# Patient Record
Sex: FEMALE | Race: WHITE | NOT HISPANIC OR LATINO | URBAN - METROPOLITAN AREA
[De-identification: names, ages, dates, MRNs, and addresses within clinical notes are randomized per-mention and may not be internally consistent; named-entity substitution may affect disease eponyms.]

---
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
## 2020-02-06 ENCOUNTER — EMERGENCY (EMERGENCY)
Facility: HOSPITAL | Age: 20
LOS: 0 days | Discharge: HOME | End: 2020-02-06
Attending: EMERGENCY MEDICINE | Admitting: STUDENT IN AN ORGANIZED HEALTH CARE EDUCATION/TRAINING PROGRAM
Payer: COMMERCIAL

## 2020-02-06 VITALS
DIASTOLIC BLOOD PRESSURE: 92 MMHG | HEART RATE: 84 BPM | OXYGEN SATURATION: 100 % | TEMPERATURE: 98 F | RESPIRATION RATE: 20 BRPM | SYSTOLIC BLOOD PRESSURE: 151 MMHG | WEIGHT: 139.55 LBS

## 2020-02-06 DIAGNOSIS — R10.32 LEFT LOWER QUADRANT PAIN: ICD-10-CM

## 2020-02-06 DIAGNOSIS — R11.0 NAUSEA: ICD-10-CM

## 2020-02-06 DIAGNOSIS — R10.9 UNSPECIFIED ABDOMINAL PAIN: ICD-10-CM

## 2020-02-06 LAB
APPEARANCE UR: CLEAR — SIGNIFICANT CHANGE UP
BILIRUB UR-MCNC: NEGATIVE — SIGNIFICANT CHANGE UP
COLOR SPEC: YELLOW — SIGNIFICANT CHANGE UP
DIFF PNL FLD: NEGATIVE — SIGNIFICANT CHANGE UP
GLUCOSE UR QL: NEGATIVE — SIGNIFICANT CHANGE UP
KETONES UR-MCNC: NEGATIVE — SIGNIFICANT CHANGE UP
LEUKOCYTE ESTERASE UR-ACNC: NEGATIVE — SIGNIFICANT CHANGE UP
NITRITE UR-MCNC: NEGATIVE — SIGNIFICANT CHANGE UP
PH UR: 7 — SIGNIFICANT CHANGE UP (ref 5–8)
PROT UR-MCNC: SIGNIFICANT CHANGE UP
SP GR SPEC: 1.02 — SIGNIFICANT CHANGE UP (ref 1.01–1.02)
UROBILINOGEN FLD QL: SIGNIFICANT CHANGE UP

## 2020-02-06 PROCEDURE — 76830 TRANSVAGINAL US NON-OB: CPT | Mod: 26

## 2020-02-06 PROCEDURE — 99284 EMERGENCY DEPT VISIT MOD MDM: CPT

## 2020-02-06 NOTE — ED PROVIDER NOTE - PHYSICAL EXAMINATION
HEAD:  normocephalic, atraumatic  EYES:  conjunctivae without injection, drainage or discharge  ENMT:  tympanic membranes pearly gray with normal landmarks; nasal mucosa moist; mouth moist without ulcerations or lesions; throat moist without erythema, exudate, ulcerations or lesions  NECK:  supple, no masses, no significant lymphadenopathy  CARDIAC:  regular rate and rhythm, normal S1 and S2, no murmurs, rubs or gallops  RESP:  respiratory rate and effort appear normal for age; lungs are clear to auscultation bilaterally; no rales or wheezes  ABDOMEN:  soft, tender L suprapubic  LYMPHATICS:  no significant lymphadenopathy  MUSCULOSKELETAL/NEURO:  normal movement, normal tone  SKIN:  normal skin color for age and race, well-perfused; warm and dry

## 2020-02-06 NOTE — ED PROVIDER NOTE - PATIENT PORTAL LINK FT
You can access the FollowMyHealth Patient Portal offered by [facility] by registering at the following website: [email or web address]. By joining [employer]’s FollowMyHealth portal, you will also be able to view your health information using other applications (apps) compatible with our system.

## 2020-02-06 NOTE — ED PROVIDER NOTE - NS ED ROS FT
Constitutional:  see HPI  Head:  no change in behavior or LOC  Eyes:  no eye redness, or discharge  ENMT:  no mouth or throat sores or lesions, not tugging at ears  Cardiac: no cyanosis  Respiratory: no cough, wheezing, or trouble breathing  GI: suprapubic pain; no vomiting or diarrhea or stool color change  :  no change in urine output  MS: no joint swelling or redness  Neuro:  no seizure, no change in movements of arms and legs  Skin:  no rashes or color changes; no lacerations or abrasions

## 2020-02-06 NOTE — ED PEDIATRIC TRIAGE NOTE - CHIEF COMPLAINT QUOTE
patient reports LUQ and LLQ radiating to flank x4 days no fever . patient reports pain before urinating and grey color stool.

## 2020-02-06 NOTE — ED PEDIATRIC NURSE NOTE - OBJECTIVE STATEMENT
Patient present to ED with complains of LLQ abdomen pain x 4 days with radiating to LUQ with nausea. Denies fever, chills, vomiting, diarrhea, blood in stool and dysuria. Patient recent traveled to [place] about 2 weeks ago.

## 2020-02-06 NOTE — ED PEDIATRIC NURSE NOTE - NSIMPLEMENTINTERV_GEN_ALL_ED
Implemented All Universal Safety Interventions:  [place] to call system. Call bell, personal items and telephone within reach. Instruct patient to call for assistance. Room bathroom lighting operational. Non-slip footwear when patient is off stretcher. Physically safe environment: no spills, clutter or unnecessary equipment. Stretcher in lowest position, wheels locked, appropriate side rails in place.

## 2020-02-06 NOTE — ED PROVIDER NOTE - NSPTACCESSSVCSAPPTDETAILS_ED_ALL_ED_FT
Please follow-up with your primary care physician or OB/GYN for repeat imaging of your ovarian cyst.

## 2020-02-06 NOTE — ED PROVIDER NOTE - ATTENDING CONTRIBUTION TO CARE
20 yo f hx ovarian cysts, size unclear, previous GC/chlamydia  pt here for 4 days intermittent L suprapubic pain, no associated dysuria/hematuria.  pain sharp, intermittent, worse w/ certain movement, not worse w/ PO intake. sx does not feel like uti. pt also endorsing pale stools. no fevers. + nausea, no vomiting or diarrhea. no vaginal discharge. pt LMP 1/20      vss  gen- NAD, aaox3  card-rrr  lungs-ctab, no wheezing or rhonchi  abd-+ left lower quadrand ttp, no guarding or rebound  Pelvic- pending  neuro- full str/sensation, cn ii-xii grossly intact, normal coordination    will get belly labs, pelvic sono, ua, no e/o std, will send swab, will provide supportive care, serial exam and ED observation period

## 2020-02-06 NOTE — ED PROVIDER NOTE - NSFOLLOWUPCLINICS_GEN_ALL_ED_FT
[facility]  OB/GYN  [address] [place] [address]  Phone: [phone]  Fax:   Follow Up Time: 1-3 Days

## 2020-02-06 NOTE — ED PROVIDER NOTE - CLINICAL SUMMARY MEDICAL DECISION MAKING FREE TEXT BOX
18 yo F with h/o ovarian cysts here for LLQ pain, no fever, no dysuria, no vag d/c, LMP 1/20/20. Upreg negative, US done - with 4.9 cm left ovarian cyst with nl flow. D/Edilberto home with f/u with OB/GYN with repeat US recommended in 6-8 weeks. Given strict return precautions.

## 2020-02-06 NOTE — ED PROVIDER NOTE - OBJECTIVE STATEMENT
18 yo female with hx of ovarian cysts presenting with abdominal pain. Pt reporting intermittent sharp L suprapubic pain for the past 4 days worse with movement and no association with meals or defecation. denies dysuria, hematuria, vaginal discharge, nausea, vomiting, diarrhea, cp. Last period was 1/20.

## 2020-02-06 NOTE — ED PROVIDER NOTE - NSFOLLOWUPINSTRUCTIONS_ED_ALL_ED_FT
Ovarian Cyst    An ovarian cyst is a fluid-filled sac that forms on an ovary. The ovaries are small organs that produce eggs in women. Various types of cysts can form on the ovaries. Most are not cancerous. Many do not cause problems, and they often go away on their own. Some may cause symptoms and require treatment. Common types of ovarian cysts include:     Functional cysts—These cysts may occur every month during the menstrual cycle. This is normal. The cysts usually go away with the next menstrual cycle if the woman does not get pregnant. Usually, there are no symptoms with a functional cyst.  Endometrioma cysts—These cysts form from the tissue that lines the uterus. They are also called "chocolate cysts" because they become filled with blood that turns brown. This type of cyst can cause pain in the lower abdomen during intercourse and with your menstrual period.  Cystadenoma cysts—This type develops from the cells on the outside of the ovary. These cysts can get very big and cause lower abdomen pain and pain with intercourse. This type of cyst can twist on itself, cut off its blood supply, and cause severe pain. It can also easily rupture and cause a lot of pain.  Dermoid cysts—This type of cyst is sometimes found in both ovaries. These cysts may contain different kinds of body tissue, such as skin, teeth, hair, or cartilage. They usually do not cause symptoms unless they get very big.   Theca lutein cysts—These cysts occur when too much of a certain hormone (human chorionic gonadotropin) is produced and overstimulates the ovaries to produce an egg. This is most common after procedures used to assist with the conception of a baby (in vitro fertilization).    CAUSES  Fertility drugs can cause a condition in which multiple large cysts are formed on the ovaries. This is called ovarian hyperstimulation syndrome.   A condition called polycystic ovary syndrome can cause hormonal imbalances that can lead to nonfunctional ovarian cysts.     SIGNS AND SYMPTOMS  Many ovarian cysts do not cause symptoms. If symptoms are present, they may include:    Pelvic pain or pressure.  Pain in the lower abdomen.  Pain during sexual intercourse.  Increasing girth (swelling) of the abdomen.  Abnormal menstrual periods.  Increasing pain with menstrual periods.  Stopping having menstrual periods without being pregnant.    DIAGNOSIS  These cysts are commonly found during a routine or annual pelvic exam. Tests may be ordered to find out more about the cyst. These tests may include:    Ultrasound.  X-ray of the pelvis.  CT scan.  MRI.  Blood tests.    TREATMENT  Many ovarian cysts go away on their own without treatment. Your health care provider may want to check your cyst regularly for 2–3 months to see if it changes. For women in menopause, it is particularly important to monitor a cyst closely because of the higher rate of ovarian cancer in menopausal women. When treatment is needed, it may include any of the following:    A procedure to drain the cyst (aspiration). This may be done using a long needle and ultrasound. It can also be done through a laparoscopic procedure. This involves using a thin, lighted tube with a tiny camera on the end (laparoscope) inserted through a small incision.   Surgery to remove the whole cyst. This may be done using laparoscopic surgery or an open surgery involving a larger incision in the lower abdomen.   Hormone treatment or birth control pills. These methods are sometimes used to help dissolve a cyst.    HOME CARE INSTRUCTIONS  Only take over-the-counter or prescription medicines as directed by your health care provider.   Follow up with your health care provider as directed.   Get regular pelvic exams and Pap tests.    SEEK MEDICAL CARE IF:  Your periods are late, irregular, or painful, or they stop.  Your pelvic pain or abdominal pain does not go away.  Your abdomen becomes larger or swollen.  You have pressure on your bladder or trouble emptying your bladder completely.  You have pain during sexual intercourse.  You have feelings of fullness, pressure, or discomfort in your stomach.  You lose weight for no apparent reason.  You feel generally ill.  You become constipated.  You lose your appetite.  You develop acne.  You have an increase in body and facial hair.  You are gaining weight, without changing your exercise and eating habits.  You think you are pregnant.    SEEK IMMEDIATE MEDICAL CARE IF:  You have increasing abdominal pain.  You feel sick to your stomach (nauseous), and you throw up (vomit).  You develop a fever that comes on suddenly.  You have abdominal pain during a bowel movement.  Your menstrual periods become heavier than usual.    Pelvic Pain, Female    Female pelvic pain can be caused by many different things and start from a variety of places. Pelvic pain refers to pain that is located in the lower half of the abdomen and between your hips. The pain may occur over a short period of time (acute) or may be reoccurring (chronic). The cause of pelvic pain may be related to disorders affecting the female reproductive organs (gynecologic), but it may also be related to the bladder, kidney stones, an intestinal complication, or muscle or skeletal problems. Getting help right away for pelvic pain is important, especially if there has been severe, sharp, or a sudden onset of unusual pain. It is also important to get help right away because some types of pelvic pain can be life threatening.     CAUSES  Below are only some of the causes of pelvic pain. The causes of pelvic pain can be in one of several categories.     Gynecologic.   Pelvic inflammatory disease.  Sexually transmitted infection.  Ovarian cyst or a twisted ovarian ligament (ovarian torsion).  Uterine lining that grows outside the uterus (endometriosis).  Fibroids, cysts, or tumors.  Ovulation.   Pregnancy.  Pregnancy that occurs outside the uterus (ectopic pregnancy).  Miscarriage.   Labor.  Abruption of the placenta or ruptured uterus.  Infection.  Uterine infection (endometritis).  Bladder infection.  Diverticulitis.  Miscarriage related to a uterine infection (septic ).  Bladder.  Inflammation of the bladder (cystitis).  Kidney stone(s).  Gastrointestinal.  Constipation.  Diverticulitis.  Neurologic.  Trauma.  Feeling pelvic pain because of mental or emotional causes (psychosomatic).   Cancers of the bowel or pelvis.     EVALUATION  Your caregiver will want to take a careful history of your concerns. This includes recent changes in your health, a careful gynecologic history of your periods (menses), and a sexual history. Obtaining your family history and medical history is also important. Your caregiver may suggest a pelvic exam. A pelvic exam will help identify the location and severity of the pain. It also helps in the evaluation of which organ system may be involved. In order to identify the cause of the pelvic pain and be properly treated, your caregiver may order tests. These tests may include:     A pregnancy test.  Pelvic ultrasonography.  An X-ray exam of the abdomen.  A urinalysis or evaluation of vaginal discharge.  Blood tests.     HOME CARE INSTRUCTIONS  Only take over-the-counter or prescription medicines for pain, discomfort, or fever as directed by your caregiver.    Rest as directed by your caregiver.    Eat a balanced diet.    Drink enough fluids to make your urine clear or pale yellow, or as directed.    Avoid sexual intercourse if it causes pain.    Apply warm or cold compresses to the lower abdomen depending on which one helps the pain.    Avoid stressful situations.    Keep a journal of your pelvic pain. Write down when it started, where the pain is located, and if there are things that seem to be associated with the pain, such as food or your menstrual cycle.  Follow up with your caregiver as directed.       SEEK MEDICAL CARE IF:  Your medicine does not help your pain.  You have abnormal vaginal discharge.     SEEK IMMEDIATE MEDICAL CARE IF:  You have heavy bleeding from the vagina.    Your pelvic pain increases.    You feel light-headed or faint.    You have chills.    You have pain with urination or blood in your urine.    You have uncontrolled diarrhea or vomiting.    You have a fever or persistent symptoms for more than 3 days.  You have a fever and your symptoms suddenly get worse.    You are being physically or sexually abused.

## 2020-02-06 NOTE — ED PROVIDER NOTE - PROGRESS NOTE DETAILS
Evan: Acknowledged from Dr. Francis, 20 yo F with h/o ovarian cysts here for LLQ pain, no fever, no dysuria, no vag d/c, LMP 1/20/20. Upreg negative, pt in US.

## 2020-02-07 LAB
CULTURE RESULTS: SIGNIFICANT CHANGE UP
SPECIMEN SOURCE: SIGNIFICANT CHANGE UP

## 2020-02-08 ENCOUNTER — EMERGENCY (EMERGENCY)
Facility: HOSPITAL | Age: 20
LOS: 0 days | Discharge: HOME | End: 2020-02-08
Attending: PEDIATRICS | Admitting: PEDIATRICS
Payer: COMMERCIAL

## 2020-02-08 VITALS
HEART RATE: 77 BPM | OXYGEN SATURATION: 100 % | SYSTOLIC BLOOD PRESSURE: 124 MMHG | WEIGHT: 135.14 LBS | TEMPERATURE: 98 F | RESPIRATION RATE: 19 BRPM | DIASTOLIC BLOOD PRESSURE: 57 MMHG

## 2020-02-08 DIAGNOSIS — R10.2 PELVIC AND PERINEAL PAIN: ICD-10-CM

## 2020-02-08 DIAGNOSIS — R10.9 UNSPECIFIED ABDOMINAL PAIN: ICD-10-CM

## 2020-02-08 PROBLEM — Z78.9 OTHER SPECIFIED HEALTH STATUS: Chronic | Status: ACTIVE | Noted: 2020-02-06

## 2020-02-08 LAB
APPEARANCE UR: CLEAR — SIGNIFICANT CHANGE UP
BILIRUB UR-MCNC: NEGATIVE — SIGNIFICANT CHANGE UP
COLOR SPEC: SIGNIFICANT CHANGE UP
DIFF PNL FLD: NEGATIVE — SIGNIFICANT CHANGE UP
GLUCOSE UR QL: NEGATIVE — SIGNIFICANT CHANGE UP
HCG UR QL: NEGATIVE — SIGNIFICANT CHANGE UP
KETONES UR-MCNC: NEGATIVE — SIGNIFICANT CHANGE UP
LEUKOCYTE ESTERASE UR-ACNC: NEGATIVE — SIGNIFICANT CHANGE UP
NITRITE UR-MCNC: NEGATIVE — SIGNIFICANT CHANGE UP
PH UR: 6.5 — SIGNIFICANT CHANGE UP (ref 5–8)
PROT UR-MCNC: NEGATIVE — SIGNIFICANT CHANGE UP
SP GR SPEC: 1.01 — SIGNIFICANT CHANGE UP (ref 1.01–1.02)
UROBILINOGEN FLD QL: SIGNIFICANT CHANGE UP

## 2020-02-08 PROCEDURE — 99284 EMERGENCY DEPT VISIT MOD MDM: CPT

## 2020-02-08 PROCEDURE — 76856 US EXAM PELVIC COMPLETE: CPT | Mod: 26

## 2020-02-08 RX ORDER — ACETAMINOPHEN 500 MG
650 TABLET ORAL ONCE
Refills: 0 | Status: COMPLETED | OUTPATIENT
Start: 2020-02-08 | End: 2020-02-08

## 2020-02-08 RX ORDER — IBUPROFEN 200 MG
600 TABLET ORAL ONCE
Refills: 0 | Status: COMPLETED | OUTPATIENT
Start: 2020-02-08 | End: 2020-02-08

## 2020-02-08 RX ADMIN — Medication 650 MILLIGRAM(S): at 16:47

## 2020-02-08 RX ADMIN — Medication 600 MILLIGRAM(S): at 17:37

## 2020-02-08 RX ADMIN — Medication 650 MILLIGRAM(S): at 17:36

## 2020-02-08 NOTE — ED PROVIDER NOTE - NSFOLLOWUPCLINICS_GEN_ALL_ED_FT
[facility]  OB/GYN  [address] [place] [address]  Phone: [phone]  Fax:   Follow Up Time: 1-3 Days

## 2020-02-08 NOTE — ED PROVIDER NOTE - NS ED ROS FT
Constitutional:  see HPI  Head:  no change in behavior or LOC  Eyes:  no eye redness, or discharge  ENMT:  no mouth or throat sores or lesions, not tugging at ears  Cardiac: no cyanosis  Respiratory: no cough, wheezing, or trouble breathing  GI: no vomiting or diarrhea or stool color change  :  b/l pelvic pain, unable to urinate x1 day  MS: no joint swelling or redness  Neuro:  no seizure, no change in movements of arms and legs  Skin:  no rashes or color changes; no lacerations or abrasions

## 2020-02-08 NOTE — ED PROVIDER NOTE - CLINICAL SUMMARY MEDICAL DECISION MAKING FREE TEXT BOX
20 yo F presents c/o pelvic pain. Pt was in ED x2 days ago with L side ovarian cyst just under 5 cm. Pt reports discomfort never went away after being d/c and currently feels pelvic pain and pressure. Has not been able to urinate since 6am this morning. No hx of similar episode.  Physical Exam: VS reviewed. Pt  appears uncomfortable, in no respiratory distress. MMM. Abdomen soft, ND, generalized tenderness appreciated. Cap refill <2 seconds. No obvious skin rash noted.  Plan: Patient was not able to urinate prior to transvaginal US secondary to retention, so US was preformed transabdominally.  US results discussed with GYN.  Post US, patient was straight cath'd and had an output of 900ml.  Patient was feeling much better post cath and then able to void on her own.

## 2020-02-08 NOTE — ED PROVIDER NOTE - NSFOLLOWUPINSTRUCTIONS_ED_ALL_ED_FT
Pelvic Pain, Female    Female pelvic pain can be caused by many different things and start from a variety of places. Pelvic pain refers to pain that is located in the lower half of the abdomen and between your hips. The pain may occur over a short period of time (acute) or may be reoccurring (chronic). The cause of pelvic pain may be related to disorders affecting the female reproductive organs (gynecologic), but it may also be related to the bladder, kidney stones, an intestinal complication, or muscle or skeletal problems. Getting help right away for pelvic pain is important, especially if there has been severe, sharp, or a sudden onset of unusual pain. It is also important to get help right away because some types of pelvic pain can be life threatening.     CAUSES  Below are only some of the causes of pelvic pain. The causes of pelvic pain can be in one of several categories.     Gynecologic.   Pelvic inflammatory disease.  Sexually transmitted infection.  Ovarian cyst or a twisted ovarian ligament (ovarian torsion).  Uterine lining that grows outside the uterus (endometriosis).  Fibroids, cysts, or tumors.  Ovulation.   Pregnancy.  Pregnancy that occurs outside the uterus (ectopic pregnancy).  Miscarriage.   Labor.  Abruption of the placenta or ruptured uterus.  Infection.  Uterine infection (endometritis).  Bladder infection.  Diverticulitis.  Miscarriage related to a uterine infection (septic ).  Bladder.  Inflammation of the bladder (cystitis).  Kidney stone(s).  Gastrointestinal.  Constipation.  Diverticulitis.  Neurologic.  Trauma.  Feeling pelvic pain because of mental or emotional causes (psychosomatic).   Cancers of the bowel or pelvis.     EVALUATION  Your caregiver will want to take a careful history of your concerns. This includes recent changes in your health, a careful gynecologic history of your periods (menses), and a sexual history. Obtaining your family history and medical history is also important. Your caregiver may suggest a pelvic exam. A pelvic exam will help identify the location and severity of the pain. It also helps in the evaluation of which organ system may be involved. In order to identify the cause of the pelvic pain and be properly treated, your caregiver may order tests. These tests may include:     A pregnancy test.  Pelvic ultrasonography.  An X-ray exam of the abdomen.  A urinalysis or evaluation of vaginal discharge.  Blood tests.     HOME CARE INSTRUCTIONS  Only take over-the-counter or prescription medicines for pain, discomfort, or fever as directed by your caregiver.    Rest as directed by your caregiver.    Eat a balanced diet.    Drink enough fluids to make your urine clear or pale yellow, or as directed.    Avoid sexual intercourse if it causes pain.    Apply warm or cold compresses to the lower abdomen depending on which one helps the pain.    Avoid stressful situations.    Keep a journal of your pelvic pain. Write down when it started, where the pain is located, and if there are things that seem to be associated with the pain, such as food or your menstrual cycle.  Follow up with your caregiver as directed.       SEEK MEDICAL CARE IF:  Your medicine does not help your pain.  You have abnormal vaginal discharge.     SEEK IMMEDIATE MEDICAL CARE IF:  You have heavy bleeding from the vagina.    Your pelvic pain increases.    You feel light-headed or faint.    You have chills.    You have pain with urination or blood in your urine.    You have uncontrolled diarrhea or vomiting.    You have a fever or persistent symptoms for more than 3 days.  You have a fever and your symptoms suddenly get worse.    You are being physically or sexually abused.

## 2020-02-08 NOTE — ED PROVIDER NOTE - PHYSICAL EXAMINATION
HEAD:  normocephalic, atraumatic  EYES:  conjunctivae without injection, drainage or discharge  ENMT:  tympanic membranes pearly gray with normal landmarks; nasal mucosa moist; mouth moist without ulcerations or lesions; throat moist without erythema, exudate, ulcerations or lesions  NECK:  supple, no masses, no significant lymphadenopathy  CARDIAC:  regular rate and rhythm, normal S1 and S2, no murmurs, rubs or gallops  RESP:  respiratory rate and effort appear normal for age; lungs are clear to auscultation bilaterally; no rales or wheezes  ABDOMEN:  soft, tender b/l suprapubic  LYMPHATICS:  no significant lymphadenopathy  MUSCULOSKELETAL/NEURO:  normal movement, normal tone  SKIN:  normal skin color for age and race, well-perfused; warm and dry

## 2020-02-08 NOTE — ED PEDIATRIC TRIAGE NOTE - CHIEF COMPLAINT QUOTE
"I am having severe lower abdominal pain I have a cyst,. I am also having problems urinating I feel pressure. I was here thursday for the same thing they told me I have a cyst 4.9 cm on left ovary I think it ruptured."

## 2020-02-08 NOTE — ED PROVIDER NOTE - PROGRESS NOTE DETAILS
Attending Note: I personally evaluated the patient. I reviewed the Resident’s note, and agree with the findings and plan except as documented in my note.   20 yo F presents c/o pelvic pain. Pt was in ED x2 days ago with L side ovarian cyst just under 5 cm. Pt reports discomfort never went away after being d/c and currently feels pelvic pain and pressure. Has not been able to urinate since 6am this morning. No hx of similar episode. discussed with obgyn. given good ovarian blood flow and <5 cm ovarian cyst, no need for urgent intervention; can follow-up with obgyn outpatient. straight cath 900 ml. will make sure pt can urinate on her own before discharge Patient is feeling much better, able to void on her own.  Will dc home with GYN follow up. Attending Note: I personally evaluated the patient. I reviewed the Resident’s note, and agree with the findings and plan except as documented in my note.   18 yo F presents c/o pelvic pain. Pt was in ED x2 days ago with L side ovarian cyst just under 5 cm. Pt reports discomfort never went away after being d/c and currently feels pelvic pain and pressure. Has not been able to urinate since 6am this morning. No hx of similar episode.  Physical Exam: VS reviewed. Pt  appears uncomfortable, in no respiratory distress. MMM. Abdomen soft, ND, generalized tenderness appreciated. Cap refill <2 seconds. No obvious skin rash noted. Chest with no retractions, no distress. Neuro exam grossly intact.  Plan: US Pelvis Transvaginal, UA, Upreg Of note, patient was not able to urinate prior to transvaginal US secondary to retention, so US was preformed transabdominally.  Post US, patient was straight cath'd and had an output of 900ml.  Patient was feeling much better post cath and then able to void on her own.

## 2020-02-08 NOTE — ED PROVIDER NOTE - PATIENT PORTAL LINK FT
You can access the FollowMyHealth Patient Portal offered by [facility] by registering at the following website: [email or web address]. By joining [employer]’s FollowMyHealth portal, you will also be able to view your health information using other applications (apps) compatible with our system.

## 2020-02-08 NOTE — ED PROVIDER NOTE - OBJECTIVE STATEMENT
18 yo female with hx of ovarian cysts presenting with abdominal pain. Pt reporting L pelvic pain for the past week, was seen in ED 2 days prior and US showed 4.9 cm ovarian cyst and scheduled to f/u with obgyn on Tuesday. Pt endorsing sudden onset constant b/l pelvic pain this morning while urinating. Pt unable to urinate since 6 am this morning but was able to pass stool. denies fever, cp, sob, hematuria, flank pain, vaginal discharge, diarrhea.

## 2020-02-09 LAB
CULTURE RESULTS: NO GROWTH — SIGNIFICANT CHANGE UP
SPECIMEN SOURCE: SIGNIFICANT CHANGE UP

## 2020-03-20 ENCOUNTER — EMERGENCY (EMERGENCY)
Facility: HOSPITAL | Age: 20
LOS: 0 days | Discharge: HOME | End: 2020-03-20
Attending: PEDIATRICS | Admitting: PEDIATRICS
Payer: OTHER MISCELLANEOUS

## 2020-03-20 VITALS
WEIGHT: 143.3 LBS | SYSTOLIC BLOOD PRESSURE: 120 MMHG | OXYGEN SATURATION: 100 % | DIASTOLIC BLOOD PRESSURE: 77 MMHG | RESPIRATION RATE: 18 BRPM | HEART RATE: 97 BPM | TEMPERATURE: 99 F

## 2020-03-20 DIAGNOSIS — S60.410A ABRASION OF RIGHT INDEX FINGER, INITIAL ENCOUNTER: ICD-10-CM

## 2020-03-20 DIAGNOSIS — S09.90XA UNSPECIFIED INJURY OF HEAD, INITIAL ENCOUNTER: ICD-10-CM

## 2020-03-20 DIAGNOSIS — S60.412A ABRASION OF RIGHT MIDDLE FINGER, INITIAL ENCOUNTER: ICD-10-CM
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
## 2020-03-20 DIAGNOSIS — S00.03XA CONTUSION OF SCALP, INITIAL ENCOUNTER: ICD-10-CM
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
## 2020-03-20 DIAGNOSIS — S00.01XA ABRASION OF SCALP, INITIAL ENCOUNTER: ICD-10-CM

## 2020-03-20 PROCEDURE — 99284 EMERGENCY DEPT VISIT MOD MDM: CPT

## 2020-03-20 RX ORDER — ONDANSETRON 8 MG/1
4 TABLET, FILM COATED ORAL ONCE
Refills: 0 | Status: COMPLETED | OUTPATIENT
Start: 2020-03-20 | End: 2020-03-20

## 2020-03-20 RX ADMIN — ONDANSETRON 4 MILLIGRAM(S): 8 TABLET, FILM COATED ORAL at 12:45

## 2020-03-20 NOTE — ED PROVIDER NOTE - PHYSICAL EXAMINATION
Physical Exam: VS reviewed. Pt is well appearing, in no respiratory distress. MMM. Cap refill <2 seconds. TMs normal b/l, no erythema, no dullness, no hemotympanum. Eyes normal with no injection, no discharge, EOMI.  + scalp hematoma to right parietal scalp with overlying superficial abrasion, approximately 1/4 cm.  No active bleeding.  Pharynx with no erythema, no exudates, no stomatitis. No anterior cervical lymph nodes appreciated. No skin rash noted. Chest is clear, no wheezing, rales or crackles. No retractions, no distress. Normal and equal breath sounds. Normal heart sounds, no muffling, no murmur appreciated. Abdomen soft, ND, no guarding, no localized tenderness.  Neuro exam grossly intact. No midline Cspine tenderness, no midline back pain.  MSK:  + abrasions to right 2nd and 3rd phalynx.

## 2020-03-20 NOTE — ED PROVIDER NOTE - CLINICAL SUMMARY MEDICAL DECISION MAKING FREE TEXT BOX
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  Patient was holding the oxygen tank and accidentally let it go, causing the metal O2 tank to hit her in the head.  She fell down the steps, then the patient and her EMT partner also fell on top of her.  No vomiting, no LOC.  She recalls the entire event.  Denies headache.  Now complaining only of nausea.  No other complaints at this time.  Tetanus UTD.  Time of injury was at approximately 11am.  Physical Exam: VS reviewed. Pt is well appearing, in no respiratory distress. MMM. Cap refill <2 seconds. TMs normal b/l, no erythema, no dullness, no hemotympanum.  + scalp hematoma to right parietal scalp with overlying superficial abrasion, approximately 1/4 cm.  No active bleeding.   Neuro exam grossly intact. No midline Cspine tenderness, no midline back pain.  MSK:  + abrasions to right 2nd and 3rd phalynx.  Scalp cleaned with gauze and sterile water.  < 1/4 cm superficial abrasion noted.  No active bleeding.  Zofran ordered, will observed. 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  Patient was holding the oxygen tank and accidentally let it go, causing the metal O2 tank to hit her in the head.  She fell down the steps, then the patient and her EMT partner also fell on top of her.  No vomiting, no LOC.  She recalls the entire event.  Denies headache.  Now complaining only of nausea.  No other complaints at this time.  Tetanus UTD.  Time of injury was at approximately 11am.  Physical Exam: VS reviewed. Pt is well appearing, in no respiratory distress. MMM. Cap refill <2 seconds. TMs normal b/l, no erythema, no dullness, no hemotympanum.  + scalp hematoma to right parietal scalp with overlying superficial abrasion, approximately 1/4 cm.  No active bleeding.   Neuro exam grossly intact. No midline Cspine tenderness, no midline back pain.  MSK:  + abrasions to right 2nd and 3rd phalynx.  Scalp cleaned with gauze and sterile water.  < 1/4 cm superficial abrasion noted.  No active bleeding.  Zofran ordered, observed, concussion clinic follow up advised.

## 2020-03-20 NOTE — ED PROVIDER NOTE - PROGRESS NOTE DETAILS
Scalp cleaned with gauze and sterile water.  < 1/4 cm superficial abrasion noted.  No active bleeding.  Zofran ordered, will observe and reassess. Patient states she is feeling better.  Father is coming to pick her up.
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
## 2020-03-20 NOTE — ED PROVIDER NOTE - CARE PLAN
Principal Discharge DX:	Head injury  Secondary Diagnosis:	Hematoma of scalp, initial encounter  Secondary Diagnosis:	Abrasion of scalp, initial encounter
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
## 2020-03-20 NOTE — ED PROVIDER NOTE - NSFOLLOWUPCLINICS_GEN_ALL_ED_FT
[facility] Concussion Program  Concussion Program  [address] [place] [address]   Phone: [phone]  Fax:   Follow Up Time: 4-6 Days

## 2020-03-20 NOTE — ED PEDIATRIC NURSE NOTE - HIGH RISK FALLS INTERVENTIONS (SCORE 12 AND ABOVE)
Environment clear of unused equipment, furniture's in place, clear of hazards/Orientation to room/Keep bed in the lowest position, unless patient is directly attended/Document fall prevention teaching and include in plan of care/Call light is within reach, educate patient/family on its functionality/Patient and family education available to parents and patient/Bed in low position, brakes on/Check patient minimum every 1 hour

## 2020-03-20 NOTE — ED PROVIDER NOTE - PATIENT PORTAL LINK FT
You can access the FollowMyHealth Patient Portal offered by [facility] by registering at the following website: [email or web address]. By joining [employer]’s FollowMyHealth portal, you will also be able to view your health information using other applications (apps) compatible with our system.

## 2020-03-20 NOTE — ED PROVIDER NOTE - OBJECTIVE STATEMENT
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  Patient was holding the oxygen tank and accidentally let it go, causing the metal O2 tank to hit her in the head.  She fell down the steps, then the patient and her EMT partner also fell on top of her.  No vomiting, no LOC.  She recalls the entire event.  Denies headache.  Now complaining only of nausea.  No other complaints at this time.  Tetanus UTD.  Time of injury was at approximately 11am.

## 2020-03-20 NOTE — ED PROVIDER NOTE - NS ED ROS FT
No fever, no sore throat, no cough, no ear pain, no rash, no vomiting, no diarrhea, no headache, no neck pain, no bony pain, no dysuria, no abdominal pain, + nausea.

## 2020-04-14 ENCOUNTER — TRANSCRIPTION ENCOUNTER (OUTPATIENT)
Age: 20
End: 2020-04-14

## 2020-04-25 ENCOUNTER — MESSAGE (OUTPATIENT)
Age: 20
End: 2020-04-25

## 2020-05-11 ENCOUNTER — TRANSCRIPTION ENCOUNTER (OUTPATIENT)
Age: 20
End: 2020-05-11

## 2020-07-19 ENCOUNTER — EMERGENCY (EMERGENCY)
Facility: HOSPITAL | Age: 20
LOS: 0 days | Discharge: HOME | End: 2020-07-19
Attending: EMERGENCY MEDICINE | Admitting: EMERGENCY MEDICINE
Payer: COMMERCIAL

## 2020-07-19 VITALS
SYSTOLIC BLOOD PRESSURE: 118 MMHG | OXYGEN SATURATION: 99 % | RESPIRATION RATE: 18 BRPM | HEART RATE: 100 BPM | DIASTOLIC BLOOD PRESSURE: 79 MMHG | TEMPERATURE: 98 F
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
## 2020-07-19 DIAGNOSIS — M54.2 CERVICALGIA: ICD-10-CM

## 2020-07-19 DIAGNOSIS — M25.511 PAIN IN RIGHT SHOULDER: ICD-10-CM
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
## 2020-07-19 PROCEDURE — 73030 X-RAY EXAM OF SHOULDER: CPT | Mod: 26,RT

## 2020-07-19 PROCEDURE — 99284 EMERGENCY DEPT VISIT MOD MDM: CPT

## 2020-07-19 RX ORDER — KETOROLAC TROMETHAMINE 30 MG/ML
30 SYRINGE (ML) INJECTION ONCE
Refills: 0 | Status: DISCONTINUED | OUTPATIENT
Start: 2020-07-19 | End: 2020-07-19

## 2020-07-19 RX ORDER — METHOCARBAMOL 500 MG/1
2 TABLET, FILM COATED ORAL
Qty: 12 | Refills: 0
Start: 2020-07-19 | End: 2020-07-21

## 2020-07-19 RX ORDER — METHOCARBAMOL 500 MG/1
1500 TABLET, FILM COATED ORAL ONCE
Refills: 0 | Status: COMPLETED | OUTPATIENT
Start: 2020-07-19 | End: 2020-07-19

## 2020-07-19 RX ADMIN — Medication 30 MILLIGRAM(S): at 19:20

## 2020-07-19 RX ADMIN — METHOCARBAMOL 1500 MILLIGRAM(S): 500 TABLET, FILM COATED ORAL at 19:20
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
## 2020-07-19 NOTE — ED PROVIDER NOTE - PATIENT PORTAL LINK FT
You can access the FollowMyHealth Patient Portal offered by [facility] by registering at the following website: [email or web address]. By joining [employer]’s FollowMyHealth portal, you will also be able to view your health information using other applications (apps) compatible with our system.

## 2020-07-19 NOTE — ED PROVIDER NOTE - NSFOLLOWUPCLINICS_GEN_ALL_ED_FT
[facility] ([facility])  Rehabilitation  [facility] [address] [place] [address]  Phone: [phone]  Fax:   Follow Up Time:
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
## 2020-07-19 NOTE — ED PROVIDER NOTE - NS ED ROS FT
Constitutional: no fever, chills, no recent weight loss, change in appetite or malaise  Eyes: no redness/discharge/pain/vision changes  Cardiac: No chest pain, SOB or edema.  Respiratory: No cough or respiratory distress  GI: No nausea, vomiting, diarrhea or abdominal pain.  : No dysuria, frequency, urgency or hematuria  MS: + rt shoulder/rt neck pain. no loss of ROM, no weakness  Neuro: No headache or weakness. No LOC.  Skin: No skin rash, bruising, abrasions  Endocrine: No history of thyroid disease or diabetes.  Except as documented in the HPI, all other systems are negative.

## 2020-07-19 NOTE — ED ADULT NURSE NOTE - NSFALLRSKHARMRISK_ED_ALL_ED
Cardiology Follow Up  Dinora Renteria  1966  [id number]  [address] [facility] [address] [id number] [phone]    1  Abnormal EKG  POCT ECG   2  Primary familial hypertrophic cardiomyopathy ([address] )  POCT ECG        Discussion/Plan:  Primary family history of hypertrophic cardiomyopathy- normal voltages on resting 12-lead EKG   Unfortunately her brother with cardiac arrest at early age  Therefore no genetic subtyping was completed  We reviewed her echocardiogram which shows no evidence of hypertrophic cardiomyopathy or any variance  Her ekg is unchanged in voltages  No heart murmurs to suggest aortic stenosis  - Annual EKG with pcp   - agree with 2-3 year checks for her children      htn- controlled very well  Continue nifedipine 30mg daiily    +lyphedema- compression sock    ACS prevention- LDL below 130 HDL 85    Interval History:  13-year-old with family history of hypertrophic cardiomyopathy, hypertension presents for follow-up  She denies having dizziness or lightheadedness  She denies having any palpitations  She denies having any chest heaviness  Her blood pressures been well controlled  No significant lower extremity edema  No syncopal episodes      Patient Active Problem List   Diagnosis    Abnormal ECG    Benign essential hypertension    Primary familial hypertrophic cardiomyopathy ([facility] [address] )    Raynaud's syndrome without gangrene    Spina bifida of lumbar spine ([address] )    Constipation    Dense breast tissue    Breast cancer screening     Past Medical History:   Diagnosis Date    Benign essential hypertension 10/09/2009    Contact lens/glasses fitting     Disorder of arteries and arterioles ([address] ) 05/12/2009    Hypertension     PONV (postoperative nausea and vomiting)     Tethered spinal cord (HCC)     from the left knee 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)     Social History     Social History    Marital status: /Civil Union     Spouse name: N/A    Number of children: N/A    Years of education: N/A     Occupational History    Not on file  Social History Main Topics    Smoking status: Never Smoker    Smokeless tobacco: Never Used      Comment: former smoker (As per allscripts)    Alcohol use Yes      Comment: seldom; denied: history of alcohol use (as per allscripts);  no alcohol use (as per allscripts)    Drug use: No    Sexual activity: Not on file     Other Topics Concern    Not on file     Social History Narrative    Daily coffee consumption (2 cups/day)    Exercise: walking    Pets/animals: Cat    Pets/animals: Dog    Sleeps 8-10 hours a day      Family History   Problem Relation Age of Onset    No Known Problems Mother     Cancer Father         liver    Heart disease Brother         hypotropic cardiomyopathy    Cardiomyopathy Brother     Sudden death Brother         cardiac (SCD)    Other Brother         idiopathic hypertrophic subarotic stenosis    Breast cancer Sister     Cancer Maternal Grandmother      Past Surgical History:   Procedure Laterality Date     SECTION      x3; 1998, [address], 2005    COLONOSCOPY N/A 7/10/2017    Procedure: COLONOSCOPY;  Surgeon: Brody Ontiveros MD;  Location: [place] [address] GI LAB;   Service: Gastroenterology    WISDOM TOOTH EXTRACTION         Current Outpatient Prescriptions:     b complex vitamins tablet, Take 1 tablet by mouth every morning, Disp: , Rfl:     Biotin 1 MG CAPS, Take 1 capsule by mouth daily, Disp: , Rfl:     BLACK COHOSH PO, Take by mouth every morning, Disp: , Rfl:     Calcium Citrate-Vitamin D (CALCIUM + D PO), Take by mouth every morning, Disp: , Rfl:     EQL EVENING PRIMROSE OIL PO, Take by mouth, Disp: , Rfl:     Lactobacillus (ACIDOPHILUS PO), Take by mouth every morning, Disp: , Rfl:     multivitamin (THERAGRAN) TABS, Take 1 tablet by mouth every morning, Disp: , Rfl:     NIFEdipine ER (ADALAT CC) 30 MG 24 hr tablet, Take 1 tablet (30 mg total) by mouth daily, Disp: 90 tablet, Rfl: 3    Omega-3 Fatty Acids (FISH OIL) 1,000 mg, by Does not apply route, Disp: , Rfl:     sertraline (ZOLOFT) 50 mg tablet, Take 50 mg by mouth daily, Disp: , Rfl:     vitamin E, tocopherol, 400 units capsule, Take 400 Units by mouth every morning, Disp: , Rfl:   No Known Allergies    Review of Systems:  Review of Systems   Constitutional: Negative  Negative for activity change, appetite change, chills, diaphoresis, fatigue, fever and unexpected weight change  HENT: Negative  Negative for congestion, dental problem, drooling, ear discharge, ear pain, facial swelling, hearing loss, mouth sores, nosebleeds, postnasal drip, rhinorrhea, sinus pain, sinus pressure, sneezing, sore throat, tinnitus, trouble swallowing and voice change  Eyes: Negative  Negative for photophobia, pain, redness, itching and visual disturbance  Respiratory: Negative  Negative for apnea, cough, choking, chest tightness, shortness of breath, wheezing and stridor  Cardiovascular: Negative  Negative for chest pain, palpitations and leg swelling  Gastrointestinal: Negative  Negative for abdominal distention, abdominal pain, anal bleeding, blood in stool, constipation, diarrhea, nausea, rectal pain and vomiting  Endocrine: Negative  Negative for cold intolerance, heat intolerance, polydipsia, polyphagia and polyuria  Genitourinary: Negative  Negative for decreased urine volume, difficulty urinating, dyspareunia, dysuria, enuresis, flank pain, frequency, genital sores, hematuria, menstrual problem, pelvic pain, urgency, vaginal bleeding, vaginal discharge and vaginal pain  Musculoskeletal: Negative  Negative for arthralgias, back pain, gait problem, joint swelling, myalgias, neck pain and neck stiffness  Skin: Negative  Negative for color change, pallor, rash and wound  Allergic/Immunologic: Negative    Negative for environmental allergies, food allergies and immunocompromised state  Neurological: Negative  Negative for dizziness, tremors, seizures, syncope, facial asymmetry, speech difficulty, weakness, light-headedness, numbness and headaches  Hematological: Negative  Negative for adenopathy  Does not bruise/bleed easily  Psychiatric/Behavioral: Negative  Negative for agitation, behavioral problems, confusion, decreased concentration, dysphoric mood, hallucinations, self-injury, sleep disturbance and suicidal ideas  The patient is not nervous/anxious and is not hyperactive  All other systems reviewed and are negative  Vitals:    11/02/18 1537   BP: 118/72   BP Location: Left arm   Patient Position: Sitting   Cuff Size: Standard   Pulse: 82   SpO2: 96%   Weight: 68 kg (150 lb)   Height: 5' 2" (1 575 m)     Physical Exam:  Physical Exam   Constitutional: She is oriented to person, place, and time  She appears well-developed and well-nourished  No distress  HENT:   Head: Normocephalic and atraumatic  Right Ear: External ear normal    Left Ear: External ear normal    Eyes: Pupils are equal, round, and reactive to light  Conjunctivae are normal  Right eye exhibits no discharge  Left eye exhibits no discharge  No scleral icterus  Neck: Normal range of motion  Neck supple  No JVD present  No tracheal deviation present  No thyromegaly present  Cardiovascular: Normal rate and regular rhythm  Exam reveals no gallop and no friction rub  No murmur heard  Pulmonary/Chest: Effort normal and breath sounds normal  No stridor  No respiratory distress  She has no wheezes  She has no rales  She exhibits no tenderness  Abdominal: Soft  Bowel sounds are normal  She exhibits no distension and no mass  There is no tenderness  There is no rebound and no guarding  Musculoskeletal: Normal range of motion  She exhibits edema  She exhibits no tenderness or deformity     Neurological: She is alert and oriented to person, place, and time  She has normal reflexes  No cranial nerve deficit  She exhibits normal muscle tone  Coordination normal    Skin: Skin is warm and dry  No rash noted  She is not diaphoretic  No erythema  No pallor  Psychiatric: She has a normal mood and affect  Her behavior is normal  Judgment and thought content normal        Labs:   No results found for: WBC, HGB, HCT, MCV, PLT  Lab Results   Component Value Date     2017    K 4 6 2017     2017    CO2 27 2017    BUN 22 2017    CREATININE 0 83 2017    GLUCOSE 86 2017    CALCIUM 9 1 2017    AST 24 2017    ALT 19 2016    ALKPHOS 57 2017    PROT 6 6 2017    BILITOT 0 3 2017     Lab Results   Component Value Date    CHOL 205 (H) 2017    CHOL 214 (H) 2016    CHOL 219 (H) 2015     Lab Results   Component Value Date    HDL 85 2017    HDL 73 2016    HDL 73 2015     Lab Results   Component Value Date    LDLCALC 112 (H) 2017    LDLCALC 128 (H) 2015     Lab Results   Component Value Date    TRIG 39 2017    TRIG 55 2016    TRIG 92 2015     No results found for: HGBA1C    Imaging & Testing   I have personally reviewed pertinent reports  EKG: Personally reviewed      Normal sinus rhythm possible left atrial enlargement nonspecific t-wave changes no change in voltages    Cardiac testing:   Results for orders placed during the hospital encounter of 10/24/16   Echo complete with contrast if indicated    Narrative [address]  [address] [facility] [place] [address]  [phone]    Transthoracic Echocardiogram  2D, M-mode, Doppler, and Color Doppler    Study date:  24-Oct-2016    Patient: Pavithra Sorensen  MR number: [id number]  Account number: [de-identified]  : 1966  Age: 45 years  Gender: Female  Status: Routine  Location: Echo lab  Height: 62 in 62 in  Weight: 138 8 lb 139 lb  BP: 112/ 72 mmHg    Indications: Hypertension    Diagnoses: 401 9 - HYPERTENSION NOS    Sonographer:  George Mcbride  Primary Physician:  OVIDIO Priest  Referring Physician:  Raquel Marroquin MD  Group:  Marlee Hatchet  Interpreting Physician:  Raquel Marroquin MD    SUMMARY    LEFT VENTRICLE:  Systolic function was normal by visual assessment  Ejection fraction was  estimated in the range of 55 % to 60 % to be 60 %  There were no regional wall motion abnormalities  Wall thickness was normal measuring   9cm at posterior wall and 1cm at septal  wall  There was no evidence of apical hypertrophy  Left ventricular diastolic  function parameters were normal for the patient's age  TRICUSPID VALVE:  There was trace regurgitation  The tricuspid jet envelope definition was inadequate for estimation of RV  systolic pressure  There are no indirect findings (abnormal RV volume or  geometry, altered pulmonary flow velocity profile, or leftward septal  displacement) which would suggest moderate or severe pulmonary hypertension  HISTORY: PRIOR HISTORY: HTN    PROCEDURE: The procedure was performed in the echo lab  This was a routine  study  The transthoracic approach was used  The study included complete 2D  imaging, M-mode, complete spectral Doppler, and color Doppler  The heart rate  was 60 bpm, at the start of the study  Image quality was good  LEFT VENTRICLE: Size was normal  Systolic function was normal by visual  assessment  Ejection fraction was estimated in the range of 55 % to 60 % to be  60 %  There were no regional wall motion abnormalities  Wall thickness was  normal measuring   9cm at posterior wall and 1cm at septal wall  There was no  evidence of apical hypertrophy  No evidence of apical thrombus  DOPPLER: Left  ventricular diastolic function parameters were normal for the patient's age      RIGHT VENTRICLE: The size was normal  Systolic function was normal  Wall  thickness was normal     LEFT ATRIUM: Size was normal     RIGHT ATRIUM: Size was normal     MITRAL VALVE: There was mild thickening  There was normal leaflet separation  DOPPLER: The transmitral velocity was within the normal range  There was no  evidence for stenosis  There was no significant regurgitation  AORTIC VALVE: The valve was trileaflet  Leaflets exhibited mildly increased  thickness and normal cuspal separation  DOPPLER: Transaortic velocity was  within the normal range  There was no evidence for stenosis  There was no  significant regurgitation  TRICUSPID VALVE: The valve structure was normal  There was normal leaflet  separation  DOPPLER: The transtricuspid velocity was within the normal range  There was no evidence for stenosis  There was trace regurgitation  The  tricuspid jet envelope definition was inadequate for estimation of RV systolic  pressure  There are no indirect findings (abnormal RV volume or geometry,  altered pulmonary flow velocity profile, or leftward septal displacement) which  would suggest moderate or severe pulmonary hypertension  PULMONIC VALVE: Leaflets exhibited normal thickness, no calcification, and  normal cuspal separation  DOPPLER: The transpulmonic velocity was within the  normal range  There was no significant regurgitation  PERICARDIUM: There was no pericardial effusion  The pericardium was normal in  appearance  AORTA: The root exhibited normal size  SYSTEMIC VEINS: IVC: The inferior vena cava was normal in size  SYSTEM MEASUREMENT TABLES    2D mode  AoR Diam 2D: 3 2 cm  LA Diam (2D): 3 5 cm  LA/Ao (2D): 1 09  FS (2D Teich): 37 7 %  IVSd (2D): 0 97 cm  LVDEV: 83 1 cm³  LVESV: 26 5 cm³  LVIDd(2D): 4 3 cm  LVISd (2D): 2 68 cm  LVPWd (2D): 0 94 cm  SV (Teich): 56 6 cm³    Unspecified Scan Mode  MV Peak A Remi: 654 mm/s  MV Peak E Remi   Mean: 710 mm/s  MVA (PHT): 4 15 cm squared  PHT: 53 ms  RVSP: 26 mm[Hg]  Max P mm[Hg]  V Max: 2030 mm/s  Vmax: 2030 mm/s  RA Area: 15 1 cm squared  RA Volume: 34 7 cm³  TAPSE: 2 2 cm    [facility]    Prepared and electronically signed by    Onesimo Townsend MD  Signed 26-Oct-2016 11:02:40       No results found for this or any previous visit  No results found for this or any previous visit  No results found for this or any previous visit  Onesimo Hinojosa  Please call with any questions or suggestions    A description of the counseling:   Goals and Barriers:  Patient's ability to self care:  Medication side effect reviewed with patient in detail and all their questions answered  "This note has been constructed using a voice recognition system  Therefore there may be syntax, spelling, and/or grammatical errors   Please call if you have any questions  " no
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
## 2020-09-19 ENCOUNTER — EMERGENCY (EMERGENCY)
Facility: HOSPITAL | Age: 20
LOS: 0 days | Discharge: HOME | End: 2020-09-19
Attending: EMERGENCY MEDICINE | Admitting: EMERGENCY MEDICINE
Payer: COMMERCIAL

## 2020-09-19 VITALS
OXYGEN SATURATION: 100 % | WEIGHT: 160.06 LBS | DIASTOLIC BLOOD PRESSURE: 69 MMHG | TEMPERATURE: 98 F | RESPIRATION RATE: 18 BRPM | SYSTOLIC BLOOD PRESSURE: 115 MMHG | HEART RATE: 93 BPM | HEIGHT: 62 IN

## 2020-09-19 VITALS
SYSTOLIC BLOOD PRESSURE: 120 MMHG | RESPIRATION RATE: 18 BRPM | TEMPERATURE: 98 F | OXYGEN SATURATION: 99 % | HEART RATE: 81 BPM | DIASTOLIC BLOOD PRESSURE: 61 MMHG

## 2020-09-19 DIAGNOSIS — N83.202 UNSPECIFIED OVARIAN CYST, LEFT SIDE: ICD-10-CM

## 2020-09-19 DIAGNOSIS — R33.9 RETENTION OF URINE, UNSPECIFIED: ICD-10-CM

## 2020-09-19 LAB
ALBUMIN SERPL ELPH-MCNC: 4.3 G/DL — SIGNIFICANT CHANGE UP (ref 3.5–5.2)
ALP SERPL-CCNC: 47 U/L — SIGNIFICANT CHANGE UP (ref 30–115)
ALT FLD-CCNC: 16 U/L — SIGNIFICANT CHANGE UP (ref 0–41)
ANION GAP SERPL CALC-SCNC: 9 MMOL/L — SIGNIFICANT CHANGE UP (ref 7–14)
APPEARANCE UR: CLEAR — SIGNIFICANT CHANGE UP
AST SERPL-CCNC: 17 U/L — SIGNIFICANT CHANGE UP (ref 0–41)
BASOPHILS # BLD AUTO: 0.04 K/UL — SIGNIFICANT CHANGE UP (ref 0–0.2)
BASOPHILS NFR BLD AUTO: 0.4 % — SIGNIFICANT CHANGE UP (ref 0–1)
BILIRUB SERPL-MCNC: <0.2 MG/DL — SIGNIFICANT CHANGE UP (ref 0.2–1.2)
BILIRUB UR-MCNC: NEGATIVE — SIGNIFICANT CHANGE UP
BLD GP AB SCN SERPL QL: SIGNIFICANT CHANGE UP
BUN SERPL-MCNC: 11 MG/DL — SIGNIFICANT CHANGE UP (ref 10–20)
CALCIUM SERPL-MCNC: 9.4 MG/DL — SIGNIFICANT CHANGE UP (ref 8.5–10.1)
CHLORIDE SERPL-SCNC: 107 MMOL/L — SIGNIFICANT CHANGE UP (ref 98–110)
CO2 SERPL-SCNC: 23 MMOL/L — SIGNIFICANT CHANGE UP (ref 17–32)
COLOR SPEC: SIGNIFICANT CHANGE UP
CREAT SERPL-MCNC: 0.7 MG/DL — SIGNIFICANT CHANGE UP (ref 0.7–1.5)
DIFF PNL FLD: NEGATIVE — SIGNIFICANT CHANGE UP
EOSINOPHIL # BLD AUTO: 0.1 K/UL — SIGNIFICANT CHANGE UP (ref 0–0.7)
EOSINOPHIL NFR BLD AUTO: 1.1 % — SIGNIFICANT CHANGE UP (ref 0–8)
GLUCOSE SERPL-MCNC: 115 MG/DL — HIGH (ref 70–99)
GLUCOSE UR QL: NEGATIVE — SIGNIFICANT CHANGE UP
HCG SERPL QL: NEGATIVE — SIGNIFICANT CHANGE UP
HCT VFR BLD CALC: 37 % — SIGNIFICANT CHANGE UP (ref 37–47)
HGB BLD-MCNC: 12.3 G/DL — SIGNIFICANT CHANGE UP (ref 12–16)
IMM GRANULOCYTES NFR BLD AUTO: 0.4 % — HIGH (ref 0.1–0.3)
KETONES UR-MCNC: NEGATIVE — SIGNIFICANT CHANGE UP
LEUKOCYTE ESTERASE UR-ACNC: NEGATIVE — SIGNIFICANT CHANGE UP
LYMPHOCYTES # BLD AUTO: 2.45 K/UL — SIGNIFICANT CHANGE UP (ref 1.2–3.4)
LYMPHOCYTES # BLD AUTO: 26.5 % — SIGNIFICANT CHANGE UP (ref 20.5–51.1)
MCHC RBC-ENTMCNC: 29.2 PG — SIGNIFICANT CHANGE UP (ref 27–31)
MCHC RBC-ENTMCNC: 33.2 G/DL — SIGNIFICANT CHANGE UP (ref 32–37)
MCV RBC AUTO: 87.9 FL — SIGNIFICANT CHANGE UP (ref 81–99)
MONOCYTES # BLD AUTO: 0.84 K/UL — HIGH (ref 0.1–0.6)
MONOCYTES NFR BLD AUTO: 9.1 % — SIGNIFICANT CHANGE UP (ref 1.7–9.3)
NEUTROPHILS # BLD AUTO: 5.76 K/UL — SIGNIFICANT CHANGE UP (ref 1.4–6.5)
NEUTROPHILS NFR BLD AUTO: 62.5 % — SIGNIFICANT CHANGE UP (ref 42.2–75.2)
NITRITE UR-MCNC: NEGATIVE — SIGNIFICANT CHANGE UP
NRBC # BLD: 0 /100 WBCS — SIGNIFICANT CHANGE UP (ref 0–0)
PH UR: 6 — SIGNIFICANT CHANGE UP (ref 5–8)
PLATELET # BLD AUTO: 309 K/UL — SIGNIFICANT CHANGE UP (ref 130–400)
POTASSIUM SERPL-MCNC: 4 MMOL/L — SIGNIFICANT CHANGE UP (ref 3.5–5)
POTASSIUM SERPL-SCNC: 4 MMOL/L — SIGNIFICANT CHANGE UP (ref 3.5–5)
PROT SERPL-MCNC: 6.8 G/DL — SIGNIFICANT CHANGE UP (ref 6–8)
PROT UR-MCNC: NEGATIVE — SIGNIFICANT CHANGE UP
RBC # BLD: 4.21 M/UL — SIGNIFICANT CHANGE UP (ref 4.2–5.4)
RBC # FLD: 12.9 % — SIGNIFICANT CHANGE UP (ref 11.5–14.5)
SODIUM SERPL-SCNC: 139 MMOL/L — SIGNIFICANT CHANGE UP (ref 135–146)
SP GR SPEC: 1.02 — SIGNIFICANT CHANGE UP (ref 1.01–1.03)
UROBILINOGEN FLD QL: SIGNIFICANT CHANGE UP
WBC # BLD: 9.23 K/UL — SIGNIFICANT CHANGE UP (ref 4.8–10.8)
WBC # FLD AUTO: 9.23 K/UL — SIGNIFICANT CHANGE UP (ref 4.8–10.8)

## 2020-09-19 PROCEDURE — 99242 OFF/OP CONSLTJ NEW/EST SF 20: CPT

## 2020-09-19 PROCEDURE — 76856 US EXAM PELVIC COMPLETE: CPT | Mod: 26

## 2020-09-19 PROCEDURE — 99285 EMERGENCY DEPT VISIT HI MDM: CPT

## 2020-09-19 RX ORDER — ACETAMINOPHEN 500 MG
650 TABLET ORAL ONCE
Refills: 0 | Status: COMPLETED | OUTPATIENT
Start: 2020-09-19 | End: 2020-09-19

## 2020-09-19 RX ORDER — SODIUM CHLORIDE 9 MG/ML
1000 INJECTION, SOLUTION INTRAVENOUS ONCE
Refills: 0 | Status: COMPLETED | OUTPATIENT
Start: 2020-09-19 | End: 2020-09-19

## 2020-09-19 RX ADMIN — SODIUM CHLORIDE 1000 MILLILITER(S): 9 INJECTION, SOLUTION INTRAVENOUS at 07:51

## 2020-09-19 RX ADMIN — Medication 650 MILLIGRAM(S): at 02:19

## 2020-09-19 RX ADMIN — Medication 650 MILLIGRAM(S): at 04:08

## 2020-09-19 NOTE — ED PROVIDER NOTE - NSFOLLOWUPINSTRUCTIONS_ED_ALL_ED_FT
Follow up with your primary medical doctor in 1-2 days as well as with the OBGYN and Urologist provided     Urinary Retention    Acute urinary retention is the temporary inability to urinate. This is a common problem in older men. As men age their prostates become larger and block the flow of urine from the bladder. If you are sent home with a singh catheter and a drainage system make sure to keep the drainage bag emptied and lower than your catheter. Keep the singh catheter in until you follow up with a urologist.    There are two main types of drainage bags. One is a large bag that usually is used at night. It has a good capacity that will allow you to sleep through the night without having to empty it. The second type is called a leg bag. It has a smaller capacity, so it needs to be emptied more frequently. However, the main advantage is that it can be attached by a leg strap and can go underneath your clothing, allowing you the freedom to move about or leave your home.     SEEK IMMEDIATE MEDICAL CARE IF YOU DEVELOP THE FOLLOWING SYMPTOMS: the catheter stops draining urine, the catheter falls out, abdominal pain, nausea/vomiting, or chills/fever.    Ovarian Cyst    An ovarian cyst is a fluid-filled sac that forms on an ovary. The ovaries are small organs that produce eggs in women. Various types of cysts can form on the ovaries. Most are not cancerous. Many do not cause problems, and they often go away on their own. Some may cause symptoms and require treatment. Common types of ovarian cysts include:     Functional cysts—These cysts may occur every month during the menstrual cycle. This is normal. The cysts usually go away with the next menstrual cycle if the woman does not get pregnant. Usually, there are no symptoms with a functional cyst.  Endometrioma cysts—These cysts form from the tissue that lines the uterus. They are also called "chocolate cysts" because they become filled with blood that turns brown. This type of cyst can cause pain in the lower abdomen during intercourse and with your menstrual period.  Cystadenoma cysts—This type develops from the cells on the outside of the ovary. These cysts can get very big and cause lower abdomen pain and pain with intercourse. This type of cyst can twist on itself, cut off its blood supply, and cause severe pain. It can also easily rupture and cause a lot of pain.  Dermoid cysts—This type of cyst is sometimes found in both ovaries. These cysts may contain different kinds of body tissue, such as skin, teeth, hair, or cartilage. They usually do not cause symptoms unless they get very big.   Theca lutein cysts—These cysts occur when too much of a certain hormone (human chorionic gonadotropin) is produced and overstimulates the ovaries to produce an egg. This is most common after procedures used to assist with the conception of a baby (in vitro fertilization).    CAUSES  Fertility drugs can cause a condition in which multiple large cysts are formed on the ovaries. This is called ovarian hyperstimulation syndrome.   A condition called polycystic ovary syndrome can cause hormonal imbalances that can lead to nonfunctional ovarian cysts.     SIGNS AND SYMPTOMS  Many ovarian cysts do not cause symptoms. If symptoms are present, they may include:    Pelvic pain or pressure.  Pain in the lower abdomen.  Pain during sexual intercourse.  Increasing girth (swelling) of the abdomen.  Abnormal menstrual periods.  Increasing pain with menstrual periods.  Stopping having menstrual periods without being pregnant.    DIAGNOSIS  These cysts are commonly found during a routine or annual pelvic exam. Tests may be ordered to find out more about the cyst. These tests may include:    Ultrasound.  X-ray of the pelvis.  CT scan.  MRI.  Blood tests.    TREATMENT  Many ovarian cysts go away on their own without treatment. Your health care provider may want to check your cyst regularly for 2–3 months to see if it changes. For women in menopause, it is particularly important to monitor a cyst closely because of the higher rate of ovarian cancer in menopausal women. When treatment is needed, it may include any of the following:    A procedure to drain the cyst (aspiration). This may be done using a long needle and ultrasound. It can also be done through a laparoscopic procedure. This involves using a thin, lighted tube with a tiny camera on the end (laparoscope) inserted through a small incision.   Surgery to remove the whole cyst. This may be done using laparoscopic surgery or an open surgery involving a larger incision in the lower abdomen.   Hormone treatment or birth control pills. These methods are sometimes used to help dissolve a cyst.    HOME CARE INSTRUCTIONS  Only take over-the-counter or prescription medicines as directed by your health care provider.   Follow up with your health care provider as directed.   Get regular pelvic exams and Pap tests.    SEEK MEDICAL CARE IF:  Your periods are late, irregular, or painful, or they stop.  Your pelvic pain or abdominal pain does not go away.  Your abdomen becomes larger or swollen.  You have pressure on your bladder or trouble emptying your bladder completely.  You have pain during sexual intercourse.  You have feelings of fullness, pressure, or discomfort in your stomach.  You lose weight for no apparent reason.  You feel generally ill.  You become constipated.  You lose your appetite.  You develop acne.  You have an increase in body and facial hair.  You are gaining weight, without changing your exercise and eating habits.  You think you are pregnant.    SEEK IMMEDIATE MEDICAL CARE IF:  You have increasing abdominal pain.  You feel sick to your stomach (nauseous), and you throw up (vomit).  You develop a fever that comes on suddenly.  You have abdominal pain during a bowel movement.  Your menstrual periods become heavier than usual.

## 2020-09-19 NOTE — ED ADULT NURSE REASSESSMENT NOTE - NS ED NURSE REASSESS COMMENT FT1
Patient A&Ox4, VSS. Patient c/o discomfort/burning in vaginal area due to singh catheter. IV intact- no s/s of redness or swelling noted. Singh in place- draining clear yellow urine. Patient ambulates self with a steady gait. Plan of care updated with patient. Will continue to monitor.

## 2020-09-19 NOTE — ED PROVIDER NOTE - CLINICAL SUMMARY MEDICAL DECISION MAKING FREE TEXT BOX
Pt with ovarian cyst- cleared by GYN for D/c with follow up. Return precautions given. Pt with urinary retention- refusing to go home with a singh, risks of going back to retention explained. Pt is willing to take a chance. Will remove singh and d/c with urology follow up.  Advised to return for worsening of symptoms.

## 2020-09-19 NOTE — ED ADULT NURSE NOTE - NSIMPLEMENTINTERV_GEN_ALL_ED
Implemented All Universal Safety Interventions:  [place] to call system. Call bell, personal items and telephone within reach. Instruct patient to call for assistance. Room bathroom lighting operational. Non-slip footwear when patient is off stretcher. Physically safe environment: no spills, clutter or unnecessary equipment. Stretcher in lowest position, wheels locked, appropriate side rails in place.

## 2020-09-19 NOTE — ED PROVIDER NOTE - CARE PROVIDER_API CALL
Tao Lassiter  Urology  [address] [place] [address]  Phone: [phone]  Fax: [phone]  Follow Up Time: 1-3 Days    Jonnathan Jarvis  OBSTETRICS AND GYNECOLOGY  [address] [place] [address]  Phone: [phone]  Fax: [phone]  Follow Up Time: 1-3 Days

## 2020-09-19 NOTE — ED PROVIDER NOTE - OBJECTIVE STATEMENT
20F with PMHx of L ovarian cyst presents to ED for lower abdominal pain and urinary retention. 20F with PMHx of L ovarian cyst presents to ED for lower abdominal pain and urinary retention x 3 hours. Pt woke up at midnight tonight and was having 6/10 lower abdominal pain and found she was unable to urinate when she tried. Endorsed mild nausea but no vomiting. Pt was in ED in 7 months ago for same reason and was told she had an ovarian cyst that caused her retention and needed to be straight cathed. Requesting to be straight cathed in ED today. Denies fever/chills, chest pain, sob, diarrhea, constipation, dysuria, hematuria, blood in stool.

## 2020-09-19 NOTE — CONSULT NOTE ADULT - SUBJECTIVE AND OBJECTIVE BOX
Chief Complaint: urinary retention    HPI: 19yo G0 LMP 8/19, with PMH of ovarian cysts, presenting with urinary retention since 9pm. Reports she woke up @0030 with suprapubic pain, 5/10 in intensity. Associated with the inability to urinate. Denies fever, chills, CP, SOB, N/V, VB, dysuria, hematuria, or foul-smelling discharge. Has had similar episode in 1/2020 where she went into urinary retention, was straight catheterized and sent home.       Ob/Gyn History:  G0               LMP - 8/19                  Cycle Length - regular  H/o ovarian cysts. Nexplanon in place. Denies history of uterine fibroids, abnormal paps, or STIs      Denies the following: constitutional symptoms, visual symptoms, cardiovascular symptoms, respiratory symptoms, GI symptoms, musculoskeletal symptoms, skin symptoms, neurologic symptoms, hematologic symptoms, allergic symptoms, psychiatric symptoms  Except any pertinent positives listed.     Home Medications:  Zoloft    Allergies  No Known Allergies      PAST MEDICAL & SURGICAL HISTORY:  Depression  No significant past surgical history      FAMILY HISTORY:  Denies any significant family history.    SOCIAL HISTORY: Denies cigarette use, alcohol use, or illicit drug use    Vital Signs Last 24 Hrs  T(F): 98.1 (19 Sep 2020 01:46), Max: 98.1 (19 Sep 2020 01:46)  HR: 93 (19 Sep 2020 01:46) (93 - 93)  BP: 115/69 (19 Sep 2020 01:46) (115/69 - 115/69)  RR: 18 (19 Sep 2020 01:46) (18 - 18)    General Appearance - AAOx3, NAD  Heart - S1S2 regular rate and rhythm  Lung - CTA Bilaterally  Abdomen - Soft, nontender, nondistended, no rebound, no rigidity, no guarding, bowel sounds present    GYN/Pelvis:    Labia Majora - Normal  Labia Minora - Normal  Clitoris - Normal  Urethra - Normal, singh catheter placed, draining clear urine  Vagina - normal, no bleeding, no discharge, no lesions  Cervix - normal, no discharge, no CMT    Uterus:  Size - Normal 6w sized anteverted uterus  Tenderness - None  Mass - None  Freely mobile    Adnexa:  Masses - None  Tenderness - None      LABS:                        12.3   9.23  )-----------( 309      ( 19 Sep 2020 06:39 )             37.0         RADIOLOGY & ADDITIONAL STUDIES:    < from: US Transvaginal (02.06.20 @ 17:07) >  EXAM:  US TRANSVAGINAL            PROCEDURE DATE:  02/06/2020        INTERPRETATION:  CLINICAL HISTORY: Left pelvic pain    COMPARISON: None.    PROCEDURE: Transabdominal and transvaginal ultrasound of the pelvis was performed, including Doppler.    LMP: 1/20/2020     FINDINGS:    UTERUS: Anteverted measuring 7.4 x 5.8 x 4 cm, with normal echogenicity and morphology. The endometrial echo complex measures 0.6 cm, which is normal in thickness.     RIGHT OVARY: measures 3.4 x 2.1 x 1.9 cm, andis unremarkable. Doppler flow is demonstrated to the right ovary.     LEFT OVARY: is enlarged measuring 5.4 x 5.3 x 5.1 cm, and is replaced by a complex cyst measuring 4.9 x 4.7 x 4.8 cm with internal echogenic components/debris. Doppler flow is demonstrated to the left ovary.     OTHER: Small free fluid in the pelvis.    IMPRESSION:   Enlarged left ovary replaced by a complex ovarian cyst, favored to represent a hemorrhagic cyst. Recommend follow-up ultrasound in 6-12 weeks. Vascular flow demonstrated to the left ovary. However, torsion remains a clinical diagnosis.

## 2020-09-19 NOTE — ED PROVIDER NOTE - NS ED ROS FT
Review of Systems:  CONSTITUTIONAL: No fever, No diaphoresis, No weight change  SKIN: No rash  HEMATOLOGIC: No abnormal bleeding or bruising  EYES: No eye pain, No blurred vision  ENT: No change in hearing, No sore throat, No neck pain, No rhinorrhea, No ear pain  RESPIRATORY: No shortness of breath, No cough  CARDIAC: No chest pain, No palpitations  GI: +lower abdominal pain,  No vomiting, No diarrhea, No constipation, No bright red blood per rectum or melena. No flank pain  : No dysuria, frequency, hematuria.   ENDO: No polydypsia, No polyuria, No heat/cold intolerance  MUSCULOSKELETAL: No joint paint, No swelling, No back pain  NEUROLOGIC: No numbness, No focal weakness, No headache, No dizziness  All other systems negative, unless specified in HPI

## 2020-09-19 NOTE — ED PROVIDER NOTE - PHYSICAL EXAMINATION
VITAL SIGNS: I have reviewed nursing notes and confirm.  CONSTITUTIONAL: well-appearing young woman laying in bed not in acute distress  SKIN: Warm dry, normal skin turgor  HEAD: NCAT  EYES: EOMI, no scleral icterus  ENT: Moist mucous membranes, normal pharynx with no erythema or exudates  NECK: Supple; non tender. Full ROM. No cervical LAD  CARD: RRR, no murmurs, rubs or gallops  RESP: clear to ausculation b/l.  No rales, rhonchi, or wheezing.  ABD: +ttp suprapubic area bl, nondistended, soft, no rebound/guarding. +L cva tenderness.  EXT: Full ROM, no bony tenderness, no pedal edema, no calf tenderness  NEURO: normal motor. normal sensory.   PSYCH: Cooperative, appropriate.

## 2020-09-19 NOTE — ED PROVIDER NOTE - PROGRESS NOTE DETAILS
SL: bedside US showed no hydronephrosis. Pt requesting straight cath SL: attempted to straight cath patient - no urine return. Castellanos placed, no urine return. Will reevalutae SL: Gyn consulted, will see patient. SL: SL: Pt endorsed to FABRICIO Ascencio Mary: Pt endorsed to Dr. Bhatti Pt evaluated after sign out. Pt reports improvement of pain. Evaluated by GYN, cleared. Castellanos placed with large amt of urine draining. UA pending. : discussed plan with patient about keeping singh in upon dc and follow up with urology. Pt declines wanting to keep singh, states "I refuse to keep this thing in me". Pt aware that she can go back into urinary retention and will require singh once again, however pt states she is willing to take the chance. Pt to be Dc with OB and urology Follow up

## 2020-09-19 NOTE — CONSULT NOTE ADULT - ASSESSMENT
A/P: 21yo G0, LMP 8/19, with urinary retention s/p singh placement, with incidental 4.7cm ovarian cyst, low suspicion for torsion, currently clinically and hemodynamically stable  - patient reported improvement in discomfort following correct singh placement. Discussed that the only definitive way to diagnose torsion is with diagnostic laparoscopy, however due to clinical presentation and sonogram findings, low suspicion for torsion  - f/u UA, UCx  - recommend trial of void  - torsions precautions given  - f/u with your OBGYN at next scheduled appointment  - recommend urology f/u  - no acute GYN intervention, dispo per ED    Dr. Bustos and Dr. Mcfarland aware. A/P: 21yo G0, LMP 8/19, with urinary retention, with incidental 4.7cm ovarian cyst, low suspicion for torsion, currently clinically and hemodynamically stable  - Singh catheter was found in the vagina, no urine in the tubing. Patient reported immediate resolution of discomfort following correct singh placement. Discussed that the only definitive way to diagnose torsion is with diagnostic laparoscopy, however due to clinical presentation and sonogram findings, low suspicion for torsion  - f/u UA, UCx  - recommend trial of void  - torsion precautions given  - f/u with your OBGYN as outpatient  - recommend outpatient urology f/u for evaluation of urinary retention   - no acute GYN intervention, dispo per ED    Dr. Bustos and Dr. Mcfarland aware.

## 2020-09-19 NOTE — ED ADULT NURSE NOTE - OBJECTIVE STATEMENT
Pt presents c/o al lower abdominal pain , urinary retention , unable to urinate , mils nausea , HX left ovarian cyst , AO x 4 , denies vaginal bleeding , no SOB , denies chest pain , no vomiting noted

## 2020-09-19 NOTE — ED PROVIDER NOTE - PATIENT PORTAL LINK FT
You can access the FollowMyHealth Patient Portal offered by [facility] by registering at the following website: [email or web address]. By joining [employer]’s FollowMyHealth portal, you will also be able to view your health information using other applications (apps) compatible with our system.

## 2020-09-19 NOTE — ED PROVIDER NOTE - ATTENDING CONTRIBUTION TO CARE
I personally evaluated the patient. I reviewed the Resident’s or Physician Assistant’s note (as assigned above), and agree with the findings and plan except as documented in my note.  20 yr F with hx of ovarian cyst presents with about 3 hours of lower pelvic pain. Associated with difficulty urinating. Denies dysuria, hematuria, trauma. No f/c/n/v/d. VS reviewed, pt non-toxic appearing, NAD. Head ncat, MMM, neck supple, normal ROM, normal s1s2 without any murmurs, Lungs CTAB with normal work of breathing. abd +BS, s/nd/+ ttp of the suprapubic abd w/o guarding or rebound, extremities wnl, neuro exam grossly normal. No acute skin rashes. Plan is ua, upreg, pelvic US and reassess.

## 2020-09-19 NOTE — ED ADULT NURSE REASSESSMENT NOTE - NS ED NURSE REASSESS COMMENT FT1
pt straight cath x2 with no urine output, MD Grissom notified. Order to insert singh and leave indwelling, monitor for output. Indwelling singh inserted without complication, no output at this time. US tech called prelim report with ovarian cyst that is midline. MD plan to leave singh in for now, OBGYN consult, labs. Pt in agreement. Pt does complain of pain on palpation of bladder, denies pain at rest.

## 2020-09-19 NOTE — ED PROVIDER NOTE - PROVIDER TOKENS
PROVIDER:[TOKEN:[[id number]],FOLLOWUP:[1-3 Days]],PROVIDER:[TOKEN:[[id number]],FOLLOWUP:[1-3 Days]]

## 2020-09-21 LAB
CULTURE RESULTS: NO GROWTH — SIGNIFICANT CHANGE UP
SPECIMEN SOURCE: SIGNIFICANT CHANGE UP

## 2020-12-22 ENCOUNTER — EMERGENCY (EMERGENCY)
Facility: HOSPITAL | Age: 20
LOS: 0 days | Discharge: HOME | End: 2020-12-22
Attending: STUDENT IN AN ORGANIZED HEALTH CARE EDUCATION/TRAINING PROGRAM | Admitting: FAMILY MEDICINE
Payer: COMMERCIAL

## 2020-12-22 VITALS
TEMPERATURE: 99 F | OXYGEN SATURATION: 97 % | DIASTOLIC BLOOD PRESSURE: 62 MMHG | SYSTOLIC BLOOD PRESSURE: 119 MMHG | HEIGHT: 62 IN | HEART RATE: 84 BPM | RESPIRATION RATE: 18 BRPM
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
## 2020-12-22 DIAGNOSIS — S61.011A LACERATION WITHOUT FOREIGN BODY OF RIGHT THUMB WITHOUT DAMAGE TO NAIL, INITIAL ENCOUNTER: ICD-10-CM

## 2020-12-22 DIAGNOSIS — Z79.899 OTHER LONG TERM (CURRENT) DRUG THERAPY: ICD-10-CM
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
## 2020-12-22 PROCEDURE — 99283 EMERGENCY DEPT VISIT LOW MDM: CPT | Mod: 25

## 2020-12-22 PROCEDURE — 12001 RPR S/N/AX/GEN/TRNK 2.5CM/<: CPT

## 2020-12-22 NOTE — ED PROVIDER NOTE - PATIENT PORTAL LINK FT
You can access the FollowMyHealth Patient Portal offered by [facility] by registering at the following website: [email or web address]. By joining [employer]’s FollowMyHealth portal, you will also be able to view your health information using other applications (apps) compatible with our system.

## 2020-12-22 NOTE — ED PROVIDER NOTE - OBJECTIVE STATEMENT
21 y/o F p/w right thumb laceration using pill , denies any foreign body sensation or numbness. no other complaints.

## 2020-12-22 NOTE — ED PROVIDER NOTE - PHYSICAL EXAMINATION
VITAL SIGNS: I have reviewed nursing notes and confirm.  CONSTITUTIONAL: well-appearing, non-toxic  SKIN: Warm dry, normal skin turgor, right thumb volar 1 cm laceration   HEAD: NCAT  PSYCH: Cooperative, appropriate.

## 2020-12-22 NOTE — ED PROVIDER NOTE - NS ED ROS FT
Constitutional: See HPI.  Neuro: see hpi   Skin: see hpi   Except as documented in the HPI, all other systems are negative.

## 2020-12-22 NOTE — ED PROVIDER NOTE - CLINICAL SUMMARY MEDICAL DECISION MAKING FREE TEXT BOX
20 yr old f that presents with R thumb laceration. lac repair. tdap uptodate. pt given wound precautions and strict return precautions.

## 2020-12-22 NOTE — ED PROVIDER NOTE - ATTENDING CONTRIBUTION TO CARE
20 yr old f w/ no pmh who presents with R thumb laceration. Pt reports that she was using a pill  for the first time, when she pressed down and cut her thumb. Pt denies any numbness, swelling or any other medical complaints.     VITAL SIGNS: I have reviewed nursing notes and confirm.  CONSTITUTIONAL: non-toxic, well appearing  SKIN: no rash, no petechiae.  EXT: Normal ROM x4. No edema. No calves tenderness. R thumb 1 cm laceration, no foreign body visualized, not actively bleeding. sensation intact in the medial/ulnar/radial distribution. pt has full ROM of the digits. no swelling or erythema appreciated.   NEURO: Alert, oriented. CN2-12 intact, equal strength bilaterally, nl gait.  PSYCH: Cooperative, appropriate.    a/p  20 yr old f that presents with R thumb laceration. lac repair. tdap uptodate. pt given wound precautions and strict return precautions.

## 2022-05-04 ENCOUNTER — EMERGENCY (EMERGENCY)
Facility: HOSPITAL | Age: 22
LOS: 0 days | Discharge: HOME | End: 2022-05-04
Attending: STUDENT IN AN ORGANIZED HEALTH CARE EDUCATION/TRAINING PROGRAM | Admitting: STUDENT IN AN ORGANIZED HEALTH CARE EDUCATION/TRAINING PROGRAM
Payer: COMMERCIAL

## 2022-05-04 VITALS
TEMPERATURE: 98 F | OXYGEN SATURATION: 100 % | SYSTOLIC BLOOD PRESSURE: 137 MMHG | HEIGHT: 62 IN | RESPIRATION RATE: 18 BRPM | DIASTOLIC BLOOD PRESSURE: 68 MMHG | HEART RATE: 78 BPM

## 2022-05-04 DIAGNOSIS — S62.647A NONDISPLACED FRACTURE OF PROXIMAL PHALANX OF LEFT LITTLE FINGER, INITIAL ENCOUNTER FOR CLOSED FRACTURE: ICD-10-CM
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
## 2022-05-04 DIAGNOSIS — M79.642 PAIN IN LEFT HAND: ICD-10-CM
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
## 2022-05-04 PROCEDURE — 26720 TREAT FINGER FRACTURE EACH: CPT | Mod: 54

## 2022-05-04 PROCEDURE — 99284 EMERGENCY DEPT VISIT MOD MDM: CPT | Mod: 57

## 2022-05-04 PROCEDURE — 73130 X-RAY EXAM OF HAND: CPT | Mod: 26,LT

## 2022-05-04 PROCEDURE — 73110 X-RAY EXAM OF WRIST: CPT | Mod: 26,LT

## 2022-05-04 RX ORDER — ACETAMINOPHEN 500 MG
650 TABLET ORAL ONCE
Refills: 0 | Status: COMPLETED | OUTPATIENT
Start: 2022-05-04 | End: 2022-05-04

## 2022-05-04 RX ADMIN — Medication 650 MILLIGRAM(S): at 13:37

## 2022-05-04 NOTE — ED PROVIDER NOTE - NSFOLLOWUPINSTRUCTIONS_ED_ALL_ED_FT
Our Emergency Department Referral Coordinators will be reaching out ot you in the next 24-48 hours from 9:00am to 5:00pm (Monday to Friday) with a follow up appointment. Please expect a phone call from the hospital in that time frame. If you do not receive a call or if you have any questions or concerns, you can reach them at [phone] or [phone].      Hand Fracture    WHAT YOU NEED TO KNOW:    A hand fracture is a break in one of the bones in your hand. This includes the bones in the wrist and fingers, and those that connect the wrist to the fingers. A hand fracture may be caused by twisting or bending the hand in the wrong way. It may also be caused by a fall, a crush injury, or a sports injury.    DISCHARGE INSTRUCTIONS:    Call your doctor if:     Your arm feels warm, tender, and painful. It may look swollen and red.      You have severe pain that does not get better, even with pain medicine.      Your injured hand or forearm is cold, numb, or pale.      Your cast or splint gets wet, damaged, or comes off.      You have new sores around your brace, cast, or splint.      You notice a bad smell coming from under your cast.      You have questions or concerns about your condition or care.    Medicines: You may need any of the following:     NSAIDs help decrease swelling and pain or fever. This medicine is available with or without a doctor's order. NSAIDs can cause stomach bleeding or kidney problems in certain people. If you take blood thinner medicine, always ask your healthcare provider if NSAIDs are safe for you. Always read the medicine label and follow directions.      Acetaminophen decreases pain and fever. It is available without a doctor's order. Ask how much to take and how often to take it. Follow directions. Read the labels of all other medicines you are using to see if they also contain acetaminophen, or ask your doctor or pharmacist. Acetaminophen can cause liver damage if not taken correctly. Do not use more than 4 grams (4,000 milligrams) total of acetaminophen in one day.       Prescription pain medicine may be given. Ask your healthcare provider how to take this medicine safely. Some prescription pain medicines contain acetaminophen. Do not take other medicines that contain acetaminophen without talking to your healthcare provider. Too much acetaminophen may cause liver damage. Prescription pain medicine may cause constipation. Ask your healthcare provider how to prevent or treat constipation.       Take your medicine as directed. Contact your healthcare provider if you think your medicine is not helping or if you have side effects. Tell him or her if you are allergic to any medicine. Keep a list of the medicines, vitamins, and herbs you take. Include the amounts, and when and why you take them. Bring the list or the pill bottles to follow-up visits. Carry your medicine list with you in case of an emergency.    Follow up with your doctor or hand specialist as directed: You may need to return to have your cast, splint, or stitches removed. Write down your questions so you remember to ask them during your visits.    Manage your symptoms:     Wear your splint as directed. Do not remove the splint until you follow up with your healthcare provider or hand specialist.      Apply ice on your hand for 15 to 20 minutes every hour or as directed. Use an ice pack, or put crushed ice in a plastic bag. Cover it with a towel before you apply it to your skin. Ice helps prevent tissue damage and decreases swelling and pain.      Elevate your hand above the level of your heart as often as you can. This will help decrease swelling and pain. Prop your hand on pillows or blankets to keep it elevated comfortably.      Go to physical therapy as directed. A physical therapist teaches you exercises to help improve movement and strength and to decrease pain.    Bathing with a cast or splint: Do not let your cast or splint get wet. Before bathing, cover the cast or splint with a plastic bag. Tape the bag to your skin above the cast or splint to seal out the water. Keep your hand out of the water in case the bag leaks. Follow instructions about when it is okay to take a bath or shower.    Cast or splint care:     Check the skin around the cast or splint for redness or sores every day.      Do not push down or lean on any part of the cast or splint because it may break.      Do not use a sharp or pointed object to scratch your skin under the cast or splint.    Activity: You may not be able to drive for up to 2 weeks. Ask when it is safe for you to drive and return to other activities, such as sports.       © Copyright [employer] 2019 All illustrations and images included in CareNotes are the copyrighted property of [employer] or [employer].

## 2022-05-04 NOTE — ED PROVIDER NOTE - ADDITIONAL NOTES AND INSTRUCTIONS:
NO WEIGHT BEARING TO LEFT HAND (NO LIFTING ON L ARM) FOR AT LEAST 4 WEEKS OR AT LEAST UNTIL CLEARED BY ORTHOPEDICS
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
## 2022-05-04 NOTE — ED PROVIDER NOTE - NS ED ROS FT
CONST: No fever, chills or bodyaches  EYES: No pain, redness, drainage or visual changes.  ENT: No ear pain or discharge, nasal discharge or congestion. No sore throat  CARD: No chest pain, palpitations  RESP: No SOB, cough, hemoptysis. No hx of asthma or COPD  GI: No abdominal pain, N/V/D  : No urinary symptoms  MS: No joint pain, back pain. (+) left hand injury.   SKIN: No rashes  NEURO: No headache, dizziness, paresthesias or LOC

## 2022-05-04 NOTE — ED ADULT NURSE NOTE - NSIMPLEMENTINTERV_GEN_ALL_ED
Implemented All Universal Safety Interventions:  [place] to call system. Call bell, personal items and telephone within reach. Instruct patient to call for assistance. Room bathroom lighting operational. Non-slip footwear when patient is off stretcher. Physically safe environment: no spills, clutter or unnecessary equipment. Stretcher in lowest position, wheels locked, appropriate side rails in place.

## 2022-05-04 NOTE — ED PROVIDER NOTE - PHYSICAL EXAMINATION
Physical Exam    Vital Signs: I have reviewed the initial vital signs.  Constitutional: well-nourished, appears stated age, no acute distress  Eyes: Conjunctiva pink, Sclera clear  Cardiovascular: S1 and S2, regular rate, regular rhythm, well-perfused extremities, radial pulses equal and 2+ b/l. good capillary refill.   Respiratory: unlabored respiratory effort, clear to auscultation bilaterally no wheezing, rales and rhonchi. pt is speaking full sentences. no accessory muscle use.   Musculoskeletal: FROM of digits b/l. (+) tenderness over the 5th metacarpal and ulnar styloid region.   Integumentary: warm, dry, no rash. no abrasions, lacerations or ecchymosis.   Neurologic: awake, alert, cranial nerves II-XII grossly intact, extremities’ motor and sensory functions grossly intact. steady gait. median, radial, and ulnar nerve motor and sensory function grossly intact b/l.   Psychiatric: appropriate mood, appropriate affect
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
## 2022-05-04 NOTE — ED PROVIDER NOTE - CARE PROVIDER_API CALL
Maximilian Rogel)  Orthopaedic Surgery  [address] [place] [address]  Phone: [phone]  Fax: [phone]  Follow Up Time: 4-6 Days

## 2022-05-04 NOTE — ED PROVIDER NOTE - NS ED ATTENDING STATEMENT MOD
This was a shared visit with the SOL. I reviewed and verified the documentation and independently performed the documented:

## 2022-05-04 NOTE — ED PROVIDER NOTE - PATIENT PORTAL LINK FT
You can access the FollowMyHealth Patient Portal offered by [facility] by registering at the following website: [email or web address]. By joining [facility]’s FollowMyHealth portal, you will also be able to view your health information using other applications (apps) compatible with our system.

## 2022-05-04 NOTE — ED PROVIDER NOTE - PROGRESS NOTE DETAILS
FF: pt placed in left ulnar gutter splint. pt advised of return precautions discussed at bedside. agreeable to dc. f/u with ortho.

## 2022-06-06 NOTE — ED ADULT NURSE NOTE - CAS DISCH ACCOMP BY
Detail Level: Detailed
Introduction Text (Please End With A Colon): The following procedure was deferred:
Reason To Defer Override: Pt to see PCP FOR THIS
Self

## 2022-06-27 ENCOUNTER — APPOINTMENT (OUTPATIENT)
Dept: ORTHOPEDIC SURGERY | Facility: CLINIC | Age: 22
End: 2022-06-27
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
## 2023-02-06 ENCOUNTER — APPOINTMENT (OUTPATIENT)
Dept: OBGYN | Facility: CLINIC | Age: 23
End: 2023-02-06

## 2023-03-07 ENCOUNTER — APPOINTMENT (OUTPATIENT)
Dept: OBGYN | Facility: CLINIC | Age: 23
End: 2023-03-07

## 2023-05-01 ENCOUNTER — APPOINTMENT (OUTPATIENT)
Dept: OBGYN | Facility: CLINIC | Age: 23
End: 2023-05-01
Payer: COMMERCIAL

## 2023-05-01 VITALS
SYSTOLIC BLOOD PRESSURE: 104 MMHG | DIASTOLIC BLOOD PRESSURE: 70 MMHG | WEIGHT: 115 LBS | TEMPERATURE: 98.6 F | HEIGHT: 62 IN | BODY MASS INDEX: 21.16 KG/M2 | HEART RATE: 75 BPM

## 2023-05-01 DIAGNOSIS — Z87.42 PERSONAL HISTORY OF OTHER DISEASES OF THE FEMALE GENITAL TRACT: ICD-10-CM

## 2023-05-01 DIAGNOSIS — Z98.890 OTHER SPECIFIED POSTPROCEDURAL STATES: ICD-10-CM

## 2023-05-01 DIAGNOSIS — Z78.9 OTHER SPECIFIED HEALTH STATUS: ICD-10-CM

## 2023-05-01 DIAGNOSIS — Z83.3 FAMILY HISTORY OF DIABETES MELLITUS: ICD-10-CM

## 2023-05-01 DIAGNOSIS — Z82.49 FAMILY HISTORY OF ISCHEMIC HEART DISEASE AND OTHER DISEASES OF THE CIRCULATORY SYSTEM: ICD-10-CM

## 2023-05-01 DIAGNOSIS — F41.9 ANXIETY DISORDER, UNSPECIFIED: ICD-10-CM

## 2023-05-01 DIAGNOSIS — Z86.19 PERSONAL HISTORY OF OTHER INFECTIOUS AND PARASITIC DISEASES: ICD-10-CM

## 2023-05-01 DIAGNOSIS — F32.A ANXIETY DISORDER, UNSPECIFIED: ICD-10-CM

## 2023-05-01 DIAGNOSIS — F17.290 NICOTINE DEPENDENCE, OTHER TOBACCO PRODUCT, UNCOMPLICATED: ICD-10-CM

## 2023-05-01 PROCEDURE — 76830 TRANSVAGINAL US NON-OB: CPT

## 2023-05-01 PROCEDURE — 99214 OFFICE O/P EST MOD 30 MIN: CPT | Mod: 25

## 2023-05-07 PROBLEM — Z87.42 HISTORY OF ABNORMAL CERVICAL PAPANICOLAOU SMEAR: Status: RESOLVED | Noted: 2023-05-01 | Resolved: 2023-05-07

## 2023-05-07 PROBLEM — Z83.3 FAMILY HISTORY OF DIABETES MELLITUS: Status: ACTIVE | Noted: 2023-05-01

## 2023-05-07 PROBLEM — F17.290 NICOTINE DEPENDENCE DUE TO VAPING TOBACCO PRODUCT: Status: ACTIVE | Noted: 2023-05-01

## 2023-05-07 PROBLEM — Z87.42 HISTORY OF OVARIAN CYST: Status: RESOLVED | Noted: 2023-05-01 | Resolved: 2023-05-07

## 2023-05-07 PROBLEM — Z78.9 EXERCISES OCCASIONALLY: Status: ACTIVE | Noted: 2023-05-01

## 2023-05-07 PROBLEM — Z98.890 HISTORY OF MEDICAL TERMINATION OF PREGNANCY: Status: RESOLVED | Noted: 2023-05-07 | Resolved: 2023-05-07

## 2023-05-07 PROBLEM — Z86.19 HISTORY OF GONORRHEA: Status: RESOLVED | Noted: 2023-05-01 | Resolved: 2023-05-07

## 2023-05-07 PROBLEM — Z87.42 HISTORY OF IRREGULAR MENSTRUAL CYCLES: Status: RESOLVED | Noted: 2023-05-01 | Resolved: 2023-05-07

## 2023-05-07 PROBLEM — Z86.19 HISTORY OF HPV INFECTION: Status: RESOLVED | Noted: 2023-05-07 | Resolved: 2023-05-07

## 2023-05-07 PROBLEM — Z82.49 FAMILY HISTORY OF HYPERTENSION: Status: ACTIVE | Noted: 2023-05-01

## 2023-05-07 PROBLEM — F41.9 ANXIETY AND DEPRESSION: Status: RESOLVED | Noted: 2023-05-01 | Resolved: 2023-05-07

## 2023-05-07 RX ORDER — ESCITALOPRAM OXALATE 5 MG/1
5 TABLET, FILM COATED ORAL
Refills: 0 | Status: ACTIVE | COMMUNITY

## 2023-05-07 RX ORDER — BACILLUS COAGULANS/INULIN 1B-250 MG
CAPSULE ORAL
Refills: 0 | Status: ACTIVE | COMMUNITY

## 2023-05-07 RX ORDER — MULTIVITAMIN
TABLET ORAL
Refills: 0 | Status: ACTIVE | COMMUNITY

## 2023-05-07 RX ORDER — BUSPIRONE HYDROCHLORIDE 7.5 MG/1
7.5 TABLET ORAL
Refills: 0 | Status: ACTIVE | COMMUNITY

## 2023-05-07 RX ORDER — CHROMIUM 200 MCG
TABLET ORAL
Refills: 0 | Status: ACTIVE | COMMUNITY

## 2023-05-07 NOTE — HISTORY OF PRESENT ILLNESS
[Normal Amount/Duration] :  normal amount and duration [Frequency: Q ___ days] : menstrual periods occur approximately every [unfilled] days [Menarche Age: ____] : age at menarche was [unfilled] [No] : Patient does not have concerns regarding sex [Currently Active] : currently active [Both] : men and women [Yes] : Yes [Patient reported PAP Smear was abnormal] : Patient reported PAP Smear was abnormal [IUD] : has an intrauterine device [Y] : Positive pregnancy history [Spotting Between  Menses] : spotting reported between menses [TextBox_4] : 22-year-old para 0-0-1-0 with LMP 4/21/2023 came for GYN exam and second opinion on IUD placement.  Patient states that she had a ParaGard IUD inserted December 2022 when she went for her annual exam.  She states that she was told by the physician that it is not in the right location but recommended keeping it in place.  Patient states she gets irregular menses but also gets spotting in between her periods as well.  She denies abnormal uterine bleeding, pelvic pain, discharge, dysuria or other GYN complaints.  She is concerned about the effectiveness of the contraception.  She also states that she was told she had an abnormal Pap smear but is not sure of the results.  She does have a known history of HPV, gonorrhea, chlamydia that were treated.  She denies history of fibroids but states she does have a history of ovarian cyst with rupture.  She is sexually active with both male and female partners.\par \par When questioned further the patient states she is concerned about taking hormonal contraception due to her anxiety and depression which she is taking medication for.  She sees psychiatry regularly and states that she is stable and maintained on her current medication.  She denies SI or HI or any adverse reactions.  We did discuss other forms of contraception, if she is not comfortable with the IUD or its placement, including combined and also progesterone only medications and their potential adverse effects and risks.  Patient understood all counseling and all questions answered satisfactorily. [PapSmeardate] : 12/2022 [TextBox_31] : Unsure of result we will get records [LMPDate] : 4/21/2023 [de-identified] : ParaGard [PGxTotal] : 1 [PGHxFullTerm] : 0 [PGHxPremature] : 0 [PGHxAbortions] : 1 [[facility]] : 0 [PGHxABInduced] : 1 [FreeTextEntry1] : 4/21/23 [FreeTextEntry3] : ParaGard

## 2023-05-07 NOTE — PHYSICAL EXAM
[Appropriately responsive] : appropriately responsive [Alert] : alert [No Acute Distress] : no acute distress [Regular Rate Rhythm] : regular rate rhythm [Soft] : soft [Non-tender] : non-tender [Non-distended] : non-distended [No Mass] : no mass [Oriented x3] : oriented x3 [No Lesions] : no lesions  [Labia Majora] : normal [Labia Minora] : normal [No Bleeding] : There was no active vaginal bleeding [Normal] : normal [IUD String] : an IUD string was noted [Tenderness] : nontender [Enlarged ___ wks] : not enlarged [Mass ___ cm] : no uterine mass was palpated [Uterine Adnexae] : normal

## 2023-05-07 NOTE — COUNSELING
[Nutrition/ Exercise/ Weight Management] : nutrition, exercise, weight management [Vitamins/Supplements] : vitamins/supplements [Bladder Hygiene] : bladder hygiene [Contraception/ Emergency Contraception/ Safe Sexual Practices] : contraception, emergency contraception, safe sexual practices [STD (testing, results, tx)] : STD (testing, results, tx) [Lab Results] : lab results [Medication Management] : medication management - - -

## 2023-05-07 NOTE — DISCUSSION/SUMMARY
[FreeTextEntry1] : Transvaginal ultrasound done to assess IUD placement.  Patient was counseled that IUD is in the lower uterine segment but does not appear to be within the cervical canal presently.  We discussed the risk/benefits/alternatives of keeping the IUD in place and or removing and inserting a new one or starting another form of contraception.  Patient states she will keep in situ at present and think about her options.  Patient was also counseled that she was noted to have multiple follicles of the right ovary and a small simple appearing cyst.  The left ovary again also had multiple follicles and appearance questionable for PCOS.  There is small amount of fluid in the cul-de-sac but patient again is asymptomatic.  Strict pain and bleeding precautions discussed.\par \par A: 22-year-old for GYN evaluation, IUD migration, PCOS?,  Intermenstrual spotting\par \par P: GYN exam done\par Transvaginal ultrasound done\par Safe sex practices\par Pain and bleeding precautions\par Contraception counseling done-patient states she will keep IUD in situ and follow-up if would like to \par Request for previous GYN and Pap records sent\par Encourage healthy diet and exercise\par Encourage menstrual diary\par Follow-up for routine exam or as needed

## 2023-07-31 ENCOUNTER — APPOINTMENT (OUTPATIENT)
Dept: OBGYN | Facility: CLINIC | Age: 23
End: 2023-07-31
Payer: COMMERCIAL

## 2023-07-31 ENCOUNTER — ASOB RESULT (OUTPATIENT)
Age: 23
End: 2023-07-31

## 2023-07-31 DIAGNOSIS — T83.32XD DISPLACEMENT OF INTRAUTERINE CONTRACEPTIVE DEVICE, SUBSEQUENT ENCOUNTER: ICD-10-CM

## 2023-07-31 PROCEDURE — 76830 TRANSVAGINAL US NON-OB: CPT

## 2023-07-31 PROCEDURE — 99214 OFFICE O/P EST MOD 30 MIN: CPT | Mod: 25

## 2023-08-01 VITALS
HEART RATE: 80 BPM | HEIGHT: 62 IN | TEMPERATURE: 98.6 F | SYSTOLIC BLOOD PRESSURE: 110 MMHG | BODY MASS INDEX: 20.61 KG/M2 | WEIGHT: 112 LBS | DIASTOLIC BLOOD PRESSURE: 78 MMHG

## 2023-08-10 PROBLEM — T83.32XD IUD MIGRATION, SUBSEQUENT ENCOUNTER: Status: ACTIVE | Noted: 2023-08-10

## 2023-08-10 NOTE — PHYSICAL EXAM
[Chaperone Declined] : Patient declined chaperone [Appropriately responsive] : appropriately responsive [Alert] : alert [No Acute Distress] : no acute distress [Regular Rate Rhythm] : regular rate rhythm [Soft] : soft [Non-tender] : non-tender [Non-distended] : non-distended [No Mass] : no mass [Oriented x3] : oriented x3 [No Lesions] : no lesions  [Labia Majora] : normal [Labia Minora] : normal [Normal] : normal [Moderate] : There was moderate vaginal bleeding [IUD String] : an IUD string was noted [Tenderness] : nontender [Enlarged ___ wks] : not enlarged [Mass ___ cm] : no uterine mass was palpated [Uterine Adnexae] : normal

## 2023-08-10 NOTE — HISTORY OF PRESENT ILLNESS
[Menarche Age: ____] : age at menarche was [unfilled] [No] : Patient does not have concerns regarding sex [Currently Active] : currently active [Patient reported PAP Smear was abnormal] : Patient reported PAP Smear was abnormal [IUD] : has an intrauterine device [Y] : Patient is sexually active [TextBox_4] : 22-year-old para 0-0-1-0 with LMP 7/27/2023 came for GYN exam and second opinion on IUD placement. Patient states that she had a ParaGard IUD inserted December 2022 when she went for her annual exam. She states that she was told by the physician that it is not in the right location but recommended keeping it in place. Patient states she gets irregular menses but also gets spotting in between her periods as well. She denies abnormal uterine bleeding, pelvic pain, discharge, dysuria or other GYN complaints. She is concerned about the effectiveness of the contraception.  She came a few months ago with the same complaints and sonogram at that time showed a IUD in the endometrium in the mid lower uterine segment patient had been counseled previously and wanted to continue the contraception at that time.  She just wants to make sure it has not shifted further or any other findings.  She also states that she was told she had an abnormal Pap smear and we received records that showed ASCUS with high risk HPV positive but 16 and 18 were negative.  She did receive all 3 HPV Gardasil vaccines.. She does have a known history of HPV, gonorrhea, chlamydia that were treated. She denies history of fibroids but states she does have a history of ovarian cyst with rupture. She is sexually active with both male and female partners. [TextBox_31] : ASCUS/high risk HPV positive [de-identified] : ParaGard [PGHxTotal] : 0 [FreeTextEntry1] : 7/27/23 [Both] : men and women

## 2023-08-10 NOTE — DISCUSSION/SUMMARY
[FreeTextEntry1] : Transvaginal ultrasound done which is unchanged from her sonogram a couple months ago.  IUD still appears to be in the mid lower uterine segment with no free fluid or other abnormal findings.  There are multiple follicles on the ovary but does not meet consistent criteria with PCOS.  Patient counseled accordingly and findings and management options.  Patient understands all risk/benefits/alternatives of continuing IUD use versus removing trying other forms of contraception or replacing with a new IUD.  Patient states that for now she would like to continue to keep the IUD in situ and will keep a menstrual diary and monitor her bleeding.  All questions answered satisfactorily.  A: 23-year-old with IUD migration, intermenstrual spotting, irregular periods, high risk HPV positive/ASCUS  P: GYN exam done Transvaginal ultrasound done Safe sex practices Pain and bleeding precautions Menstrual diary encouraged Contraception counseling done-patient would like to keep current IUD in situ-precautions given Encourage healthy diet and exercise Follow-up soon when not bleeding for repeat Pap smear or as needed

## 2023-09-01 ENCOUNTER — APPOINTMENT (OUTPATIENT)
Dept: OBGYN | Facility: CLINIC | Age: 23
End: 2023-09-01

## 2023-10-23 ENCOUNTER — APPOINTMENT (OUTPATIENT)
Dept: OBGYN | Facility: CLINIC | Age: 23
End: 2023-10-23

## 2023-12-19 ENCOUNTER — APPOINTMENT (OUTPATIENT)
Dept: OBGYN | Facility: CLINIC | Age: 23
End: 2023-12-19
Payer: COMMERCIAL

## 2023-12-19 VITALS
DIASTOLIC BLOOD PRESSURE: 70 MMHG | HEIGHT: 62 IN | SYSTOLIC BLOOD PRESSURE: 116 MMHG | WEIGHT: 114 LBS | TEMPERATURE: 98.6 F | HEART RATE: 80 BPM | BODY MASS INDEX: 20.98 KG/M2

## 2023-12-19 DIAGNOSIS — Z30.09 ENCOUNTER FOR OTHER GENERAL COUNSELING AND ADVICE ON CONTRACEPTION: ICD-10-CM

## 2023-12-19 DIAGNOSIS — Z01.419 ENCOUNTER FOR GYNECOLOGICAL EXAMINATION (GENERAL) (ROUTINE) W/OUT ABNORMAL FINDINGS: ICD-10-CM

## 2023-12-19 PROCEDURE — 99395 PREV VISIT EST AGE 18-39: CPT

## 2023-12-19 NOTE — ED PEDIATRIC NURSE NOTE - CAS DISCH TRANSFER METHOD
Otc Regimen: I recommended a broad spectrum sunscreen with a SPF of 30 or higher. I explained that SPF 30 sunscreens block approximately 97 percent of the sun's harmful rays. Sunscreens should be applied at least 15 minutes prior to expected sun exposure and then every 2 hours after that as long as sun exposure continues.\\n Detail Level: Zone Otc Regimen: Moisturizer daily Continue Regimen: Triamcinolone 0.1% cream BID X 2 weeks max, then prn for flares Private car

## 2023-12-20 PROBLEM — Z30.09 GENERAL COUNSELING AND ADVICE FOR CONTRACEPTIVE MANAGEMENT: Status: ACTIVE | Noted: 2023-05-03

## 2023-12-20 LAB
HBV SURFACE AG SER QL: NONREACTIVE
HCV AB SER QL: NONREACTIVE
HCV S/CO RATIO: 0.14 S/CO
HIV1+2 AB SPEC QL IA.RAPID: NONREACTIVE
T PALLIDUM AB SER QL IA: NEGATIVE

## 2023-12-20 NOTE — HISTORY OF PRESENT ILLNESS
[Patient reported PAP Smear was normal] : Patient reported PAP Smear was normal [IUD] : has an intrauterine device [Y] : Positive pregnancy history [Menarche Age: ____] : age at menarche was [unfilled] [Currently Active] : currently active [Men] : men [No] : No [Patient reported PAP Smear was abnormal] : Patient reported PAP Smear was abnormal [HIV Test offered] : HIV Test offered [Syphilis test offered] : Syphilis test offered [Gonorrhea test offered] : Gonorrhea test offered [Chlamydia test offered] : Chlamydia test offered [Trichomonas test offered] : Trichomonas test offered [HPV test offered] : HPV test offered [Hepatitis B test offered] : Hepatitis B test offered [Hepatitis C test offered] : Hepatitis C test offered [TextBox_4] : 23-year-old para 0-0-1-0 with LMP 12/1/2023 came for annual GYN exam and Pap smear.  She has known irregular menses but denies abnormal uterine bleeding, pelvic pain, discharge, dysuria or other GYN complaints.  She also has known low-lying IUD in situ but with counseling of all management options had opted to keep current IUD in situ for contraception management.  Patient has history of ASCUS with high risk HPV (16 and 18 negative) at her last Pap smear.  She denies other STDs or fibroids.  She does have a history of ovarian cyst with rupture but no recurrence and she is asymptomatic.  She does have a new male partner and is not using condoms.  She would like STD testing done. [PapSmeardate] : 12/2022 [TextBox_31] : ASCUS with high risk HPV [LMPDate] : 12/1/23 [MensesFreq] : 28 [MensesLength] : 5 [MensesAmount] : heavy to light  [de-identified] : inserted 2022  [PGxTotal] : 1 [PGHxFullTerm] : 0 [PGHxPremature] : 0 [PGHxAbortions] : 1 [[facility]] : 0 [PGHxABInduced] : 1 [FreeTextEntry1] : 12/1/23 [Yes] : Yes [FreeTextEntry3] : ParaGard IUD

## 2023-12-20 NOTE — DISCUSSION/SUMMARY
[FreeTextEntry1] : A: 23-year-old for annual GYN exam, ASCUS/high risk HPV, irregular menses, IUD in situ  P: GYN exam done Pap smear and STD testing done Safe sex practices Pain and bleeding precautions Menstrual diary encouraged Contraception counseling done-patient was to continue current IUD use Encourage healthy diet and exercise Follow-up 6 months for repeat Pap or as needed

## 2023-12-20 NOTE — PHYSICAL EXAM
[Chaperone Declined] : Patient declined chaperone [Appropriately responsive] : appropriately responsive [Alert] : alert [No Acute Distress] : no acute distress [No Lymphadenopathy] : no lymphadenopathy [Regular Rate Rhythm] : regular rate rhythm [Soft] : soft [Non-tender] : non-tender [Non-distended] : non-distended [No Mass] : no mass [Oriented x3] : oriented x3 [Examination Of The Breasts] : a normal appearance [No Discharge] : no discharge [No Masses] : no breast masses were palpable [No Lesions] : no lesions  [Labia Majora] : normal [Labia Minora] : normal [Normal] : normal [No Bleeding] : There was no active vaginal bleeding [IUD String] : an IUD string was noted [Tenderness] : nontender [Enlarged ___ wks] : not enlarged [Mass ___ cm] : no uterine mass was palpated [Uterine Adnexae] : normal [FreeTextEntry5] : Pap smear done

## 2023-12-21 LAB
C TRACH RRNA SPEC QL NAA+PROBE: DETECTED
HPV HIGH+LOW RISK DNA PNL CVX: NOT DETECTED
N GONORRHOEA RRNA SPEC QL NAA+PROBE: NOT DETECTED
SOURCE AMPLIFICATION: NORMAL
SOURCE AMPLIFICATION: NORMAL
T VAGINALIS RRNA SPEC QL NAA+PROBE: NOT DETECTED

## 2023-12-22 ENCOUNTER — NON-APPOINTMENT (OUTPATIENT)
Age: 23
End: 2023-12-22

## 2023-12-22 LAB — CYTOLOGY CVX/VAG DOC THIN PREP: ABNORMAL

## 2023-12-22 RX ORDER — METRONIDAZOLE 7.5 MG/G
0.75 GEL VAGINAL
Qty: 1 | Refills: 0 | Status: ACTIVE | COMMUNITY
Start: 2023-12-22 | End: 1900-01-01

## 2023-12-22 RX ORDER — AZITHROMYCIN 500 MG/1
500 TABLET, FILM COATED ORAL
Qty: 2 | Refills: 0 | Status: ACTIVE | COMMUNITY
Start: 2023-12-22 | End: 1900-01-01

## 2024-01-29 ENCOUNTER — APPOINTMENT (OUTPATIENT)
Dept: OBGYN | Facility: CLINIC | Age: 24
End: 2024-01-29
Payer: COMMERCIAL

## 2024-01-29 VITALS
DIASTOLIC BLOOD PRESSURE: 63 MMHG | BODY MASS INDEX: 21.16 KG/M2 | HEART RATE: 78 BPM | HEIGHT: 62 IN | SYSTOLIC BLOOD PRESSURE: 101 MMHG | WEIGHT: 115 LBS

## 2024-01-29 DIAGNOSIS — B97.7 PAPILLOMAVIRUS AS THE CAUSE OF DISEASES CLASSIFIED ELSEWHERE: ICD-10-CM

## 2024-01-29 DIAGNOSIS — Z87.42 PERSONAL HISTORY OF OTHER DISEASES OF THE FEMALE GENITAL TRACT: ICD-10-CM

## 2024-01-29 DIAGNOSIS — T83.32XA DISPLACEMENT OF INTRAUTERINE CONTRACEPTIVE DEVICE, INITIAL ENCOUNTER: ICD-10-CM

## 2024-01-29 DIAGNOSIS — A74.9 CHLAMYDIAL INFECTION, UNSPECIFIED: ICD-10-CM

## 2024-01-29 DIAGNOSIS — Z97.5 EXCESSIVE AND FREQUENT MENSTRUATION WITH REGULAR CYCLE: ICD-10-CM

## 2024-01-29 DIAGNOSIS — Z11.3 ENCOUNTER FOR SCREENING FOR INFECTIONS WITH A PREDOMINANTLY SEXUAL MODE OF TRANSMISSION: ICD-10-CM

## 2024-01-29 DIAGNOSIS — N92.0 EXCESSIVE AND FREQUENT MENSTRUATION WITH REGULAR CYCLE: ICD-10-CM

## 2024-01-29 DIAGNOSIS — Z71.89 OTHER SPECIFIED COUNSELING: ICD-10-CM

## 2024-01-29 PROCEDURE — 99213 OFFICE O/P EST LOW 20 MIN: CPT

## 2024-01-29 NOTE — HISTORY OF PRESENT ILLNESS
[Patient reported PAP Smear was normal] : Patient reported PAP Smear was normal [Gonorrhea test offered] : Gonorrhea test offered [Chlamydia test offered] : Chlamydia test offered [Trichomonas test offered] : Trichomonas test offered [Y] : Patient uses contraception [IUD] : has an intrauterine device [N] : Patient is not sexually active [TextBox_4] : 23-year-old para 0-0-1-0 with LMP 1/26/24 came for follow-up exam for previously noted BV and chlamydia at her recent annual.  Patient states she took medication for both and her partner was treated but she is no longer with that partner and not currently sexually active.  Patient came for test of cure.  She has known irregular menses but denies abnormal uterine bleeding, pelvic pain, discharge, dysuria or other GYN complaints. She also has known low-lying IUD in situ but with counseling of all management options had opted to keep current IUD in situ for contraception management.  She also has known high risk HPV and abnormal Pap smear but her most recent Pap smear was negative and HPV was negative as well.  Patient encouraged to continue routine follow-up. [PapSmeardate] : 12/2023 [de-identified] : ParaGard [FreeTextEntry1] : 1/26/2024 [Previously active] : previously active [Men] : men [No] : No

## 2024-01-29 NOTE — PHYSICAL EXAM
[Chaperone Declined] : Patient declined chaperone [Appropriately responsive] : appropriately responsive [Alert] : alert [No Acute Distress] : no acute distress [Regular Rate Rhythm] : regular rate rhythm [Soft] : soft [Non-tender] : non-tender [Non-distended] : non-distended [No Mass] : no mass [Oriented x3] : oriented x3 [No Lesions] : no lesions  [Labia Majora] : normal [Labia Minora] : normal [Moderate] : There was moderate vaginal bleeding [Normal] : normal [Tenderness] : nontender [Enlarged ___ wks] : not enlarged [Mass ___ cm] : no uterine mass was palpated [Uterine Adnexae] : normal [FreeTextEntry4] : Genital culture done

## 2024-01-29 NOTE — DISCUSSION/SUMMARY
[FreeTextEntry1] : A: 23-year-old with chlamydia for test of cure, irregular menses, low-lying IUD contraception  P: GYN exam done Genital culture done Safe sex practices effective Pain and bleeding precautions Menstrual diary encouraged Follow-up for routine exam or as needed

## 2024-02-02 LAB
A VAGINAE DNA VAG QL NAA+PROBE: NORMAL
BVAB2 DNA VAG QL NAA+PROBE: ABNORMAL
C KRUSEI DNA VAG QL NAA+PROBE: NEGATIVE
C KRUSEI DNA VAG QL NAA+PROBE: NEGATIVE
C KRUSEI DNA VAG QL NAA+PROBE: NORMAL
C KRUSEI DNA VAG QL NAA+PROBE: NORMAL
C TRACH RRNA SPEC QL NAA+PROBE: NEGATIVE
CANDIDA DNA VAG QL NAA+PROBE: NORMAL
MEGA1 DNA VAG QL NAA+PROBE: ABNORMAL
N GONORRHOEA RRNA SPEC QL NAA+PROBE: NEGATIVE
T VAGINALIS RRNA SPEC QL NAA+PROBE: NEGATIVE

## 2024-02-07 DIAGNOSIS — N76.0 ACUTE VAGINITIS: ICD-10-CM

## 2024-02-07 DIAGNOSIS — B96.89 ACUTE VAGINITIS: ICD-10-CM

## 2024-02-09 RX ORDER — METRONIDAZOLE 7.5 MG/G
0.75 GEL VAGINAL
Qty: 1 | Refills: 1 | Status: ACTIVE | COMMUNITY
Start: 2024-02-07

## 2024-06-18 ENCOUNTER — APPOINTMENT (OUTPATIENT)
Dept: OBGYN | Facility: CLINIC | Age: 24
End: 2024-06-18

## 2024-07-30 ENCOUNTER — APPOINTMENT (OUTPATIENT)
Dept: OBGYN | Facility: CLINIC | Age: 24
End: 2024-07-30

## 2024-07-30 VITALS
SYSTOLIC BLOOD PRESSURE: 110 MMHG | WEIGHT: 125 LBS | BODY MASS INDEX: 23 KG/M2 | HEIGHT: 62 IN | HEART RATE: 80 BPM | DIASTOLIC BLOOD PRESSURE: 78 MMHG | TEMPERATURE: 98.6 F

## 2024-07-30 DIAGNOSIS — R30.0 DYSURIA: ICD-10-CM

## 2024-07-30 DIAGNOSIS — R39.9 UNSPECIFIED SYMPTOMS AND SIGNS INVOLVING THE GENITOURINARY SYSTEM: ICD-10-CM

## 2024-07-30 LAB
BILIRUB UR QL STRIP: NORMAL
CLARITY UR: CLEAR
COLLECTION METHOD: NORMAL
GLUCOSE UR-MCNC: NORMAL
HCG UR QL: 0.2 EU/DL
HCG UR QL: NEGATIVE
HGB UR QL STRIP.AUTO: ABNORMAL
KETONES UR-MCNC: NORMAL
LEUKOCYTE ESTERASE UR QL STRIP: NORMAL
NITRITE UR QL STRIP: NORMAL
PH UR STRIP: 6.5
PROT UR STRIP-MCNC: NORMAL
QUALITY CONTROL: YES
SP GR UR STRIP: 1.02

## 2024-07-30 PROCEDURE — 99213 OFFICE O/P EST LOW 20 MIN: CPT

## 2024-07-30 PROCEDURE — 81003 URINALYSIS AUTO W/O SCOPE: CPT | Mod: QW

## 2024-07-30 PROCEDURE — 81025 URINE PREGNANCY TEST: CPT

## 2024-07-30 RX ORDER — SULFAMETHOXAZOLE AND TRIMETHOPRIM 800; 160 MG/1; MG/1
800-160 TABLET ORAL TWICE DAILY
Qty: 14 | Refills: 0 | Status: ACTIVE | COMMUNITY
Start: 2024-07-30 | End: 1900-01-01

## 2024-07-30 NOTE — HISTORY OF PRESENT ILLNESS
[Menarche Age: ____] : age at menarche was [unfilled] [FreeTextEntry1] : 6/14/24 [Currently Active] : currently active [Men] : men [No] : No

## 2024-08-06 LAB
A VAGINAE DNA VAG QL NAA+PROBE: ABNORMAL
BACTERIA UR CULT: NORMAL
BVAB2 DNA VAG QL NAA+PROBE: ABNORMAL
C KRUSEI DNA VAG QL NAA+PROBE: NEGATIVE
C TRACH RRNA SPEC QL NAA+PROBE: NEGATIVE
CANDIDA DNA VAG QL NAA+PROBE: NEGATIVE
MEGA1 DNA VAG QL NAA+PROBE: ABNORMAL
MYCOPLASMA HOMINIS CULTURE: POSITIVE
N GONORRHOEA RRNA SPEC QL NAA+PROBE: NEGATIVE
T VAGINALIS RRNA SPEC QL NAA+PROBE: NEGATIVE
UREAPLASMA CULTURE: NEGATIVE

## 2024-08-07 PROBLEM — A49.3 MYCOPLASMA INFECTION: Status: ACTIVE | Noted: 2024-08-07

## 2024-09-20 ENCOUNTER — APPOINTMENT (OUTPATIENT)
Dept: OBGYN | Facility: CLINIC | Age: 24
End: 2024-09-20

## 2024-11-04 DIAGNOSIS — R10.2 PELVIC AND PERINEAL PAIN: ICD-10-CM

## 2024-11-20 ENCOUNTER — NON-APPOINTMENT (OUTPATIENT)
Age: 24
End: 2024-11-20

## 2024-12-03 ENCOUNTER — NON-APPOINTMENT (OUTPATIENT)
Age: 24
End: 2024-12-03

## 2025-02-10 ENCOUNTER — APPOINTMENT (OUTPATIENT)
Dept: OBGYN | Facility: CLINIC | Age: 25
End: 2025-02-10